# Patient Record
Sex: FEMALE | Race: WHITE | Employment: OTHER | URBAN - METROPOLITAN AREA
[De-identification: names, ages, dates, MRNs, and addresses within clinical notes are randomized per-mention and may not be internally consistent; named-entity substitution may affect disease eponyms.]

---

## 2017-02-09 ENCOUNTER — HISTORICAL (OUTPATIENT)
Dept: RADIOLOGY | Facility: HOSPITAL | Age: 67
End: 2017-02-09

## 2017-03-01 ENCOUNTER — HISTORICAL (OUTPATIENT)
Dept: PREADMISSION TESTING | Facility: HOSPITAL | Age: 67
End: 2017-03-01

## 2017-03-01 ENCOUNTER — HISTORICAL (OUTPATIENT)
Dept: LAB | Facility: HOSPITAL | Age: 67
End: 2017-03-01

## 2018-03-29 ENCOUNTER — HISTORICAL (OUTPATIENT)
Dept: LAB | Facility: HOSPITAL | Age: 68
End: 2018-03-29

## 2018-03-29 LAB — GRAM STN SPEC: NORMAL

## 2018-03-31 LAB — FINAL CULTURE: NORMAL

## 2018-05-07 LAB — FINAL CULTURE: NORMAL

## 2022-01-01 ENCOUNTER — HOSPITAL ENCOUNTER (INPATIENT)
Facility: HOSPITAL | Age: 72
LOS: 3 days | DRG: 283 | End: 2022-05-11
Attending: EMERGENCY MEDICINE | Admitting: INTERNAL MEDICINE
Payer: MEDICARE

## 2022-01-01 ENCOUNTER — LAB REQUISITION (OUTPATIENT)
Dept: LAB | Facility: HOSPITAL | Age: 72
End: 2022-01-01
Payer: MEDICARE

## 2022-01-01 VITALS
BODY MASS INDEX: 36.73 KG/M2 | HEIGHT: 65 IN | OXYGEN SATURATION: 97 % | SYSTOLIC BLOOD PRESSURE: 107 MMHG | RESPIRATION RATE: 32 BRPM | HEART RATE: 119 BPM | DIASTOLIC BLOOD PRESSURE: 60 MMHG | TEMPERATURE: 98 F | WEIGHT: 220.44 LBS

## 2022-01-01 DIAGNOSIS — I46.9 CARDIOPULMONARY ARREST: Primary | ICD-10-CM

## 2022-01-01 DIAGNOSIS — R06.02 SOB (SHORTNESS OF BREATH): ICD-10-CM

## 2022-01-01 DIAGNOSIS — J69.0 ASPIRATION PNEUMONITIS: ICD-10-CM

## 2022-01-01 DIAGNOSIS — R09.89 SUSPECTED CHF (CONGESTIVE HEART FAILURE): ICD-10-CM

## 2022-01-01 DIAGNOSIS — L73.8 OTHER SPECIFIED FOLLICULAR DISORDERS: ICD-10-CM

## 2022-01-01 DIAGNOSIS — R41.89 UNRESPONSIVENESS: ICD-10-CM

## 2022-01-01 DIAGNOSIS — I21.4 NSTEMI (NON-ST ELEVATED MYOCARDIAL INFARCTION): ICD-10-CM

## 2022-01-01 DIAGNOSIS — I21.4 NSTEMI (NON-ST ELEVATION MYOCARDIAL INFARCTION): ICD-10-CM

## 2022-01-01 DIAGNOSIS — Z78.9: ICD-10-CM

## 2022-01-01 DIAGNOSIS — M62.222 NONTRAUMATIC ISCHEMIC INFARCTION OF MUSCLE OF LEFT UPPER ARM: ICD-10-CM

## 2022-01-01 DIAGNOSIS — E87.20 ACIDOSIS: ICD-10-CM

## 2022-01-01 LAB
% SATURATION: 98
% SATURATION: 99
% SATURATION: 99
ABS NEUT (OLG): 6.7 X10(3)/MCL (ref 2.1–9.2)
ABS NEUT (OLG): 6461 X10(3)/MCL (ref 2.1–9.2)
ALBUMIN SERPL-MCNC: 1.9 GM/DL (ref 3.4–4.8)
ALBUMIN SERPL-MCNC: 2.1 GM/DL (ref 3.4–4.8)
ALBUMIN SERPL-MCNC: 2.5 GM/DL (ref 3.4–4.8)
ALBUMIN SERPL-MCNC: 2.9 GM/DL (ref 3.4–4.8)
ALBUMIN SERPL-MCNC: 3.5 GM/DL (ref 3.4–4.8)
ALBUMIN/GLOB SERPL: 0.5 RATIO (ref 1.1–2)
ALBUMIN/GLOB SERPL: 0.7 RATIO (ref 1.1–2)
ALBUMIN/GLOB SERPL: 0.8 RATIO (ref 1.1–2)
ALBUMIN/GLOB SERPL: 0.9 RATIO (ref 1.1–2)
ALBUMIN/GLOB SERPL: 1 RATIO (ref 1.1–2)
ALP SERPL-CCNC: 124 UNIT/L (ref 40–150)
ALP SERPL-CCNC: 157 UNIT/L (ref 40–150)
ALP SERPL-CCNC: 193 UNIT/L (ref 40–150)
ALP SERPL-CCNC: 62 UNIT/L (ref 40–150)
ALP SERPL-CCNC: 74 UNIT/L (ref 40–150)
ALT SERPL-CCNC: 16 UNIT/L (ref 0–55)
ALT SERPL-CCNC: 290 UNIT/L (ref 0–55)
ALT SERPL-CCNC: 322 UNIT/L (ref 0–55)
ALT SERPL-CCNC: 367 UNIT/L (ref 0–55)
ALT SERPL-CCNC: 487 UNIT/L (ref 0–55)
ANION GAP SERPL CALC-SCNC: 16 MMOL/L (ref 8–16)
ANION GAP SERPL CALC-SCNC: 22 MEQ/L
ANION GAP SERPL CALC-SCNC: 22 MEQ/L
ANION GAP SERPL CALC-SCNC: 24 MEQ/L
ANION GAP SERPL CALC-SCNC: 25 MEQ/L
APTT PPP: 114.5 SECONDS (ref 23.2–33.7)
APTT PPP: 149.7 SECONDS (ref 23.2–33.7)
APTT PPP: 27.2 SECONDS (ref 23.2–33.7)
APTT PPP: 38.7 SECONDS (ref 23.2–33.7)
APTT PPP: 65.8 SECONDS (ref 23.2–33.7)
APTT PPP: 84 SECONDS (ref 23.2–33.7)
APTT PPP: >200 SECONDS (ref 23.2–33.7)
AST SERPL-CCNC: 21 UNIT/L (ref 5–34)
AST SERPL-CCNC: 397 UNIT/L (ref 5–34)
AST SERPL-CCNC: 424 UNIT/L (ref 5–34)
AST SERPL-CCNC: 470 UNIT/L (ref 5–34)
AST SERPL-CCNC: 865 UNIT/L (ref 5–34)
AV INDEX (PROSTH): 0.76
AV MEAN GRADIENT: 5 MMHG
AV PEAK GRADIENT: 8 MMHG
AV VALVE AREA: 2.39 CM2
AV VELOCITY RATIO: 0.68
BACTERIA SPEC CULT: NORMAL
BACTERIA SPT CULT: ABNORMAL
BASE EXCESS ARTERIAL: -11.2 MMOL/L (ref -2–2)
BASE EXCESS ARTERIAL: -11.6 MMOL/L (ref -2–2)
BASE EXCESS ARTERIAL: -2.7 MMOL/L (ref -2–2)
BASE EXCESS ARTERIAL: -22 MMOL/L (ref -2–2)
BASE EXCESS ARTERIAL: -9.1 MMOL/L (ref -2–2)
BASE EXCESS ARTERIAL: 4.6 MMOL/L (ref -2–2)
BASE EXCESS ARTERIAL: ABNORMAL
BASE EXCESS ARTERIAL: NORMAL
BASOPHILS # BLD AUTO: 0.04 X10(3)/MCL (ref 0–0.2)
BASOPHILS # BLD AUTO: 0.06 X10(3)/MCL (ref 0–0.2)
BASOPHILS # BLD AUTO: 0.08 X10(3)/MCL (ref 0–0.2)
BASOPHILS # BLD AUTO: 0.09 X10(3)/MCL (ref 0–0.2)
BASOPHILS # BLD AUTO: 0.11 X10(3)/MCL (ref 0–0.2)
BASOPHILS # BLD AUTO: 0.18 X10(3)/MCL (ref 0–0.2)
BASOPHILS NFR BLD AUTO: 0.4 %
BASOPHILS NFR BLD AUTO: 0.4 %
BASOPHILS NFR BLD AUTO: 0.5 %
BASOPHILS NFR BLD AUTO: 0.7 %
BASOPHILS NFR BLD AUTO: 1 %
BASOPHILS NFR BLD AUTO: 1.2 %
BASOPHILS NFR BLD MANUAL: 1 %
BASOPHILS NFR BLD MANUAL: 1 %
BASOPHILS NFR BLD MANUAL: 91 %
BILIRUBIN DIRECT+TOT PNL SERPL-MCNC: 0.1 MG/DL (ref 0–0.5)
BILIRUBIN DIRECT+TOT PNL SERPL-MCNC: 0.5 MG/DL (ref 0–0.5)
BILIRUBIN DIRECT+TOT PNL SERPL-MCNC: 0.5 MG/DL (ref 0–0.5)
BILIRUBIN DIRECT+TOT PNL SERPL-MCNC: 0.5 MG/DL (ref 0–0.8)
BILIRUBIN DIRECT+TOT PNL SERPL-MCNC: 0.5 MG/DL (ref 0–0.8)
BILIRUBIN DIRECT+TOT PNL SERPL-MCNC: 0.6 MG/DL (ref 0–0.8)
BILIRUBIN DIRECT+TOT PNL SERPL-MCNC: 0.6 MG/DL (ref 0–0.8)
BILIRUBIN DIRECT+TOT PNL SERPL-MCNC: 0.7 MG/DL
BILIRUBIN DIRECT+TOT PNL SERPL-MCNC: 0.9 MG/DL (ref 0–0.5)
BILIRUBIN DIRECT+TOT PNL SERPL-MCNC: 0.9 MG/DL (ref 0–0.8)
BILIRUBIN DIRECT+TOT PNL SERPL-MCNC: 1 MG/DL
BILIRUBIN DIRECT+TOT PNL SERPL-MCNC: 1 MG/DL
BILIRUBIN DIRECT+TOT PNL SERPL-MCNC: 1.5 MG/DL
BILIRUBIN DIRECT+TOT PNL SERPL-MCNC: 1.6 MG/DL (ref 0–0.5)
BILIRUBIN DIRECT+TOT PNL SERPL-MCNC: 2.5 MG/DL
BNP BLD-MCNC: 333.5 PG/ML
BSA FOR ECHO PROCEDURE: 2.14 M2
BUN SERPL-MCNC: 17 MG/DL (ref 9.8–20.1)
BUN SERPL-MCNC: 22.9 MG/DL (ref 9.8–20.1)
BUN SERPL-MCNC: 23 MG/DL (ref 6–30)
BUN SERPL-MCNC: 23 MG/DL (ref 9.8–20.1)
BUN SERPL-MCNC: 24.7 MG/DL (ref 9.8–20.1)
BUN SERPL-MCNC: 25 MG/DL (ref 9.8–20.1)
BUN SERPL-MCNC: 25.7 MG/DL (ref 9.8–20.1)
BUN SERPL-MCNC: 26 MG/DL (ref 9.8–20.1)
BUN SERPL-MCNC: 26.2 MG/DL (ref 9.8–20.1)
BUN SERPL-MCNC: 26.9 MG/DL (ref 9.8–20.1)
BUN SERPL-MCNC: 29.4 MG/DL (ref 9.8–20.1)
BUN SERPL-MCNC: 49.5 MG/DL (ref 9.8–20.1)
BURR CELLS (OLG): ABNORMAL
CA-I SERPL-MCNC: 0.97 MMOL/L
CA-I SERPL-MCNC: 1.05 MMOL/L
CA-I SERPL-MCNC: 1.23 MMOL/L
CALCIUM BLD-MCNC: 0.94 MMOL/L
CALCIUM BLD-MCNC: 1.04 MMOL/L
CALCIUM BLD-MCNC: 1.05 MMOL/L
CALCIUM SERPL-MCNC: 10 MG/DL (ref 8.4–10.2)
CALCIUM SERPL-MCNC: 7 MG/DL (ref 8.4–10.2)
CALCIUM SERPL-MCNC: 7.4 MG/DL (ref 8.4–10.2)
CALCIUM SERPL-MCNC: 7.6 MG/DL (ref 8.4–10.2)
CALCIUM SERPL-MCNC: 7.7 MG/DL (ref 8.4–10.2)
CALCIUM SERPL-MCNC: 7.8 MG/DL (ref 8.4–10.2)
CALCIUM SERPL-MCNC: 7.8 MG/DL (ref 8.4–10.2)
CALCIUM SERPL-MCNC: 7.9 MG/DL (ref 8.4–10.2)
CALCIUM SERPL-MCNC: 8 MG/DL (ref 8.4–10.2)
CALCIUM SERPL-MCNC: 8.4 MG/DL (ref 8.4–10.2)
CALCIUM SERPL-MCNC: 8.5 MG/DL (ref 8.4–10.2)
CHLORIDE SERPL-SCNC: 100 MMOL/L (ref 98–107)
CHLORIDE SERPL-SCNC: 101 MMOL/L (ref 98–107)
CHLORIDE SERPL-SCNC: 102 MMOL/L (ref 98–107)
CHLORIDE SERPL-SCNC: 102 MMOL/L (ref 98–107)
CHLORIDE SERPL-SCNC: 103 MMOL/L (ref 95–110)
CHLORIDE SERPL-SCNC: 103 MMOL/L (ref 98–107)
CHLORIDE SERPL-SCNC: 105 MMOL/L (ref 98–107)
CHLORIDE SERPL-SCNC: 106 MMOL/L (ref 98–107)
CHLORIDE SERPL-SCNC: 107 MMOL/L (ref 98–107)
CHLORIDE SERPL-SCNC: 107 MMOL/L (ref 98–107)
CHLORIDE SERPL-SCNC: 99 MMOL/L (ref 98–107)
CHLORIDE SERPL-SCNC: 99 MMOL/L (ref 98–107)
CHOLEST SERPL-MCNC: 263 MG/DL
CHOLEST/HDLC SERPL: 9 {RATIO} (ref 0–5)
CO2 SERPL-SCNC: 14 MMOL/L (ref 23–31)
CO2 SERPL-SCNC: 14 MMOL/L (ref 23–31)
CO2 SERPL-SCNC: 15 MMOL/L (ref 23–31)
CO2 SERPL-SCNC: 16 MMOL/L (ref 23–31)
CO2 SERPL-SCNC: 16 MMOL/L (ref 23–31)
CO2 SERPL-SCNC: 18 MMOL/L (ref 23–31)
CO2 SERPL-SCNC: 18 MMOL/L (ref 23–31)
CO2 SERPL-SCNC: 19 MMOL/L (ref 23–31)
CO2 SERPL-SCNC: 21 MMOL/L (ref 23–31)
CO2 SERPL-SCNC: 23 MMOL/L (ref 23–31)
CO2 SERPL-SCNC: 24 MMOL/L (ref 23–31)
CO2 TOTAL: 15.8
CO2 TOTAL: 17.8
CO2 TOTAL: 20.6
CO2 TOTAL: 26.5
CO2 TOTAL: 31.2
CREAT SERPL-MCNC: 1.2 MG/DL (ref 0.5–1.4)
CREAT SERPL-MCNC: 1.52 MG/DL (ref 0.55–1.02)
CREAT SERPL-MCNC: 1.56 MG/DL (ref 0.55–1.02)
CREAT SERPL-MCNC: 1.75 MG/DL (ref 0.55–1.02)
CREAT SERPL-MCNC: 1.78 MG/DL (ref 0.55–1.02)
CREAT SERPL-MCNC: 1.8 MG/DL (ref 0.55–1.02)
CREAT SERPL-MCNC: 1.81 MG/DL (ref 0.55–1.02)
CREAT SERPL-MCNC: 1.92 MG/DL (ref 0.55–1.02)
CREAT SERPL-MCNC: 1.93 MG/DL (ref 0.55–1.02)
CREAT SERPL-MCNC: 1.98 MG/DL (ref 0.55–1.02)
CREAT SERPL-MCNC: 2 MG/DL (ref 0.55–1.02)
CREAT SERPL-MCNC: 2.01 MG/DL (ref 0.55–1.02)
CREAT/UREA NIT SERPL: 13
CREAT/UREA NIT SERPL: 14
CREAT/UREA NIT SERPL: 14
DOP CALC AO PEAK VEL: 1.37 M/S
DOP CALC AO VTI: 22.7 CM
DOP CALC LVOT AREA: 3.1 CM2
DOP CALC LVOT DIAMETER: 2 CM
DOP CALC LVOT PEAK VEL: 0.93 M/S
DOP CALC LVOT STROKE VOLUME: 54.32 CM3
DOP CALC MV VTI: 37.2 CM
DOP CALCLVOT PEAK VEL VTI: 17.3 CM
E WAVE DECELERATION TIME: 277 MSEC
E/A RATIO: 1
E/E' RATIO: 9.25 M/S
EJECTION FRACTION: 45 %
EOSINOPHIL # BLD AUTO: 0 X10(3)/MCL (ref 0–0.9)
EOSINOPHIL # BLD AUTO: 0 X10(3)/MCL (ref 0–0.9)
EOSINOPHIL # BLD AUTO: 0.05 X10(3)/MCL (ref 0–0.9)
EOSINOPHIL # BLD AUTO: 0.05 X10(3)/MCL (ref 0–0.9)
EOSINOPHIL # BLD AUTO: 0.12 X10(3)/MCL (ref 0–0.9)
EOSINOPHIL # BLD AUTO: 0.8 X10(3)/MCL (ref 0–0.9)
EOSINOPHIL NFR BLD AUTO: 0 %
EOSINOPHIL NFR BLD AUTO: 0 %
EOSINOPHIL NFR BLD AUTO: 0.3 %
EOSINOPHIL NFR BLD AUTO: 0.6 %
EOSINOPHIL NFR BLD AUTO: 1.3 %
EOSINOPHIL NFR BLD AUTO: 7 %
EOSINOPHIL NFR BLD MANUAL: 1 %
EOSINOPHIL NFR BLD MANUAL: 182 /MCL (ref 0–900)
EOSINOPHIL NFR BLD MANUAL: 2 %
EOSINOPHIL NFR BLD MANUAL: 6 %
EOSINOPHIL NFR BLD MANUAL: 78 /MCL (ref 0–900)
ERYTHROCYTE [DISTWIDTH] IN BLOOD BY AUTOMATED COUNT: 12.8 % (ref 11.5–17)
ERYTHROCYTE [DISTWIDTH] IN BLOOD BY AUTOMATED COUNT: 12.9 % (ref 11.5–17)
ERYTHROCYTE [DISTWIDTH] IN BLOOD BY AUTOMATED COUNT: 13 % (ref 11.5–17)
ERYTHROCYTE [DISTWIDTH] IN BLOOD BY AUTOMATED COUNT: 13.2 % (ref 11.5–17)
ERYTHROCYTE [DISTWIDTH] IN BLOOD BY AUTOMATED COUNT: 13.3 % (ref 11.5–17)
ERYTHROCYTE [DISTWIDTH] IN BLOOD BY AUTOMATED COUNT: 13.8 % (ref 11.5–17)
ERYTHROCYTE [DISTWIDTH] IN BLOOD BY AUTOMATED COUNT: 14 % (ref 11.5–17)
EST. AVERAGE GLUCOSE BLD GHB EST-MCNC: 335 MG/DL
GLOBULIN SER-MCNC: 2.6 GM/DL (ref 2.4–3.5)
GLOBULIN SER-MCNC: 2.6 GM/DL (ref 2.4–3.5)
GLOBULIN SER-MCNC: 3.1 GM/DL (ref 2.4–3.5)
GLOBULIN SER-MCNC: 3.5 GM/DL (ref 2.4–3.5)
GLOBULIN SER-MCNC: 4.7 GM/DL (ref 2.4–3.5)
GLUCOSE SERPL-MCNC: 119 MG/DL (ref 82–115)
GLUCOSE SERPL-MCNC: 176 MG/DL (ref 82–115)
GLUCOSE SERPL-MCNC: 194 MG/DL (ref 82–115)
GLUCOSE SERPL-MCNC: 296 MG/DL (ref 82–115)
GLUCOSE SERPL-MCNC: 486 MG/DL (ref 82–115)
GLUCOSE SERPL-MCNC: 512 MG/DL (ref 70–110)
GLUCOSE SERPL-MCNC: 554 MG/DL (ref 82–115)
GLUCOSE SERPL-MCNC: 685 MG/DL (ref 82–115)
GLUCOSE SERPL-MCNC: 722 MG/DL (ref 82–115)
GLUCOSE SERPL-MCNC: 780 MG/DL (ref 82–115)
GLUCOSE SERPL-MCNC: 781 MG/DL (ref 82–115)
GLUCOSE SERPL-MCNC: 800 MG/DL (ref 82–115)
GLUCOSE SERPL-MCNC: >500 MG/DL (ref 70–110)
GRAM STN SPEC: ABNORMAL
GRAM STN SPEC: ABNORMAL
HAV IGM SERPL QL IA: NONREACTIVE
HBA1C MFR BLD: 13.3 %
HBV CORE IGM SERPL QL IA: NONREACTIVE
HBV SURFACE AG SERPL QL IA: NONREACTIVE
HCO3 ARTERIAL: 11.7 MMOL/L (ref 18–23)
HCO3 ARTERIAL: 14.8 MMOL/L (ref 18–23)
HCO3 ARTERIAL: 16.4 MMOL/L (ref 18–23)
HCO3 ARTERIAL: 19.1 MMOL/L (ref 18–23)
HCO3 ARTERIAL: 22.1 MMOL/L (ref 18–23)
HCO3 ARTERIAL: 22.5 MMOL/L (ref 18–23)
HCO3 ARTERIAL: 22.9 MMOL/L (ref 18–23)
HCO3 ARTERIAL: 24.2 MMOL/L (ref 18–23)
HCO3 ARTERIAL: 24.8 MMOL/L (ref 18–23)
HCO3 ARTERIAL: 29.8 MMOL/L (ref 18–23)
HCT VFR BLD AUTO: 37.2 % (ref 37–47)
HCT VFR BLD AUTO: 37.6 % (ref 37–47)
HCT VFR BLD AUTO: 41.7 % (ref 37–47)
HCT VFR BLD AUTO: 42.5 % (ref 37–47)
HCT VFR BLD AUTO: 44.7 % (ref 37–47)
HCT VFR BLD AUTO: 46.9 % (ref 37–47)
HCT VFR BLD AUTO: 50.1 % (ref 37–47)
HCT VFR BLD CALC: 50 %PCV (ref 36–54)
HCV AB SERPL QL IA: NONREACTIVE
HDLC SERPL-MCNC: 30 MG/DL (ref 35–60)
HGB BLD-MCNC: 11.4 GM/DL (ref 12–16)
HGB BLD-MCNC: 12.2 GM/DL (ref 12–16)
HGB BLD-MCNC: 13.4 GM/DL (ref 12–16)
HGB BLD-MCNC: 13.9 GM/DL (ref 12–16)
HGB BLD-MCNC: 14.2 GM/DL (ref 12–16)
HGB BLD-MCNC: 14.8 GM/DL (ref 12–16)
HGB BLD-MCNC: 15.2 GM/DL (ref 12–16)
HGB BLD-MCNC: 17 G/DL
HGB BLD-MCNC: ABNORMAL G/DL
HGB BLD-MCNC: NORMAL G/DL
HIV 1+2 AB+HIV1 P24 AG SERPL QL IA: NONREACTIVE
IMM GRANULOCYTES # BLD AUTO: 0.04 X10(3)/MCL (ref 0–0.02)
IMM GRANULOCYTES # BLD AUTO: 0.04 X10(3)/MCL (ref 0–0.02)
IMM GRANULOCYTES # BLD AUTO: 0.07 X10(3)/MCL (ref 0–0.02)
IMM GRANULOCYTES # BLD AUTO: 0.08 X10(3)/MCL (ref 0–0.02)
IMM GRANULOCYTES # BLD AUTO: 0.34 X10(3)/MCL (ref 0–0.02)
IMM GRANULOCYTES # BLD AUTO: 0.5 X10(3)/MCL (ref 0–0.02)
IMM GRANULOCYTES # BLD AUTO: 3.65 X10(3)/MCL (ref 0–0.02)
IMM GRANULOCYTES NFR BLD AUTO: 0.4 % (ref 0–0.43)
IMM GRANULOCYTES NFR BLD AUTO: 0.5 % (ref 0–0.43)
IMM GRANULOCYTES NFR BLD AUTO: 0.5 % (ref 0–0.43)
IMM GRANULOCYTES NFR BLD AUTO: 0.8 % (ref 0–0.43)
IMM GRANULOCYTES NFR BLD AUTO: 1.7 % (ref 0–0.43)
IMM GRANULOCYTES NFR BLD AUTO: 18.8 % (ref 0–0.43)
IMM GRANULOCYTES NFR BLD AUTO: 2.5 % (ref 0–0.43)
INR BLD: 1.06 (ref 0–1.3)
INR BLD: 1.7 (ref 0–1.3)
INSTRUMENT WBC (OLG): 19.4 X10(3)/MCL
INSTRUMENT WBC (OLG): 7.8 X10(3)/MCL
INSTRUMENT WBC (OLG): 9.1 X10(3)/MCL
LACTATE SERPL-SCNC: 10.8 MMOL/L (ref 0.5–2.2)
LACTATE SERPL-SCNC: 11.9 MMOL/L (ref 0.5–2.2)
LACTATE SERPL-SCNC: 12.1 MMOL/L (ref 0.5–2.2)
LACTATE SERPL-SCNC: 12.4 MMOL/L (ref 0.5–2.2)
LACTATE SERPL-SCNC: 12.5 MMOL/L (ref 0.5–2.2)
LACTATE SERPL-SCNC: 16.9 MMOL/L (ref 0.5–2.2)
LACTATE SERPL-SCNC: 3.6 MMOL/L (ref 0.5–2.2)
LACTATE SERPL-SCNC: 3.6 MMOL/L (ref 0.5–2.2)
LACTATE SERPL-SCNC: 3.7 MMOL/L (ref 0.5–2.2)
LACTATE SERPL-SCNC: 3.9 MMOL/L (ref 0.5–2.2)
LACTATE SERPL-SCNC: 4.1 MMOL/L (ref 0.5–2.2)
LACTATE SERPL-SCNC: 4.8 MMOL/L (ref 0.5–2.2)
LACTATE SERPL-SCNC: 5.2 MMOL/L (ref 0.5–2.2)
LACTATE SERPL-SCNC: 5.4 MMOL/L (ref 0.5–2.2)
LACTATE SERPL-SCNC: 6.5 MMOL/L (ref 0.5–2.2)
LACTATE SERPL-SCNC: 6.8 MMOL/L (ref 0.5–2.2)
LACTATE SERPL-SCNC: 7 MMOL/L (ref 0.5–2.2)
LACTATE SERPL-SCNC: 8 MMOL/L (ref 0.5–2.2)
LACTATE SERPL-SCNC: 8.5 MMOL/L (ref 0.5–2.2)
LACTATE SERPL-SCNC: 9.1 MMOL/L (ref 0.5–2.2)
LDLC SERPL CALC-MCNC: 142 MG/DL (ref 50–140)
LEFT ATRIUM SIZE: 3.1 CM
LEFT ATRIUM VOLUME INDEX MOD: 5.3 ML/M2
LEFT ATRIUM VOLUME MOD: 10.9 CM3
LEFT VENTRICLE DIASTOLIC VOLUME INDEX: 28.83 ML/M2
LEFT VENTRICLE DIASTOLIC VOLUME: 59.4 ML
LEFT VENTRICLE SYSTOLIC VOLUME INDEX: 16.5 ML/M2
LEFT VENTRICLE SYSTOLIC VOLUME: 33.9 ML
LV LATERAL E/E' RATIO: 9.25 M/S
LV SEPTAL E/E' RATIO: 9.25 M/S
LVOT MG: 2 MMHG
LVOT MV: 0.74 CM/S
LYMPHOCYTES # BLD AUTO: 1.15 X10(3)/MCL (ref 0.6–4.6)
LYMPHOCYTES # BLD AUTO: 1.17 X10(3)/MCL (ref 0.6–4.6)
LYMPHOCYTES # BLD AUTO: 1.28 X10(3)/MCL (ref 0.6–4.6)
LYMPHOCYTES # BLD AUTO: 1.57 X10(3)/MCL (ref 0.6–4.6)
LYMPHOCYTES # BLD AUTO: 2.14 X10(3)/MCL (ref 0.6–4.6)
LYMPHOCYTES # BLD AUTO: 4.99 X10(3)/MCL (ref 0.6–4.6)
LYMPHOCYTES NFR BLD AUTO: 10 %
LYMPHOCYTES NFR BLD AUTO: 14.7 %
LYMPHOCYTES NFR BLD AUTO: 23.6 %
LYMPHOCYTES NFR BLD AUTO: 43.8 %
LYMPHOCYTES NFR BLD AUTO: 5.8 %
LYMPHOCYTES NFR BLD AUTO: 6.5 %
LYMPHOCYTES NFR BLD MANUAL: 0.97 X10(3)/MCL (ref 3.4–13.7)
LYMPHOCYTES NFR BLD MANUAL: 1638 /MCL (ref 3400–13700)
LYMPHOCYTES NFR BLD MANUAL: 18 %
LYMPHOCYTES NFR BLD MANUAL: 2 %
LYMPHOCYTES NFR BLD MANUAL: 3 %
LYMPHOCYTES NFR BLD MANUAL: 42 %
LYMPHOCYTES NFR BLD MANUAL: 5 %
LYMPHOCYTES NFR BLD MANUAL: 702 /MCL (ref 3400–13700)
LYMPHOCYTES NFR BLD MANUAL: 9 %
Lab: >700
MACROCYTES BLD QL SMEAR: ABNORMAL
MAGNESIUM SERPL-MCNC: 1.5 MG/DL (ref 1.6–2.6)
MAGNESIUM SERPL-MCNC: 1.6 MG/DL (ref 1.6–2.6)
MAGNESIUM SERPL-MCNC: 1.7 MG/DL (ref 1.6–2.6)
MAGNESIUM SERPL-MCNC: 1.7 MG/DL (ref 1.6–2.6)
MAGNESIUM SERPL-MCNC: 1.9 MG/DL (ref 1.6–2.6)
MAGNESIUM SERPL-MCNC: 2 MG/DL (ref 1.6–2.6)
MAGNESIUM SERPL-MCNC: 2 MG/DL (ref 1.6–2.6)
MAGNESIUM SERPL-MCNC: 2.1 MG/DL (ref 1.6–2.6)
MAGNESIUM SERPL-MCNC: 2.3 MG/DL (ref 1.6–2.6)
MCH RBC QN AUTO: 27.7 PG (ref 27–31)
MCH RBC QN AUTO: 28 PG (ref 27–31)
MCH RBC QN AUTO: 28.2 PG (ref 27–31)
MCH RBC QN AUTO: 28.3 PG (ref 27–31)
MCH RBC QN AUTO: 28.7 PG (ref 27–31)
MCHC RBC AUTO-ENTMCNC: 30.3 MG/DL (ref 33–36)
MCHC RBC AUTO-ENTMCNC: 30.6 MG/DL (ref 33–36)
MCHC RBC AUTO-ENTMCNC: 31.6 MG/DL (ref 33–36)
MCHC RBC AUTO-ENTMCNC: 31.8 MG/DL (ref 33–36)
MCHC RBC AUTO-ENTMCNC: 32.1 MG/DL (ref 33–36)
MCHC RBC AUTO-ENTMCNC: 32.4 MG/DL (ref 33–36)
MCHC RBC AUTO-ENTMCNC: 32.7 MG/DL (ref 33–36)
MCV RBC AUTO: 86.2 FL (ref 80–94)
MCV RBC AUTO: 86.6 FL (ref 80–94)
MCV RBC AUTO: 87.2 FL (ref 80–94)
MCV RBC AUTO: 88.7 FL (ref 80–94)
MCV RBC AUTO: 88.7 FL (ref 80–94)
MCV RBC AUTO: 90.5 FL (ref 80–94)
MCV RBC AUTO: 94.7 FL (ref 80–94)
METAMYELOCYTES NFR BLD MANUAL: 1 %
METAMYELOCYTES NFR BLD MANUAL: 12 %
METAMYELOCYTES NFR BLD MANUAL: 17 %
METAMYELOCYTES NFR BLD MANUAL: 8 %
METAMYELOCYTES NFR BLD MANUAL: 8 %
MONOCYTES # BLD AUTO: 0.17 X10(3)/MCL (ref 0.1–1.3)
MONOCYTES # BLD AUTO: 0.27 X10(3)/MCL (ref 0.1–1.3)
MONOCYTES # BLD AUTO: 0.29 X10(3)/MCL (ref 0.1–1.3)
MONOCYTES # BLD AUTO: 0.35 X10(3)/MCL (ref 0.1–1.3)
MONOCYTES # BLD AUTO: 0.46 X10(3)/MCL (ref 0.1–1.3)
MONOCYTES # BLD AUTO: 0.77 X10(3)/MCL (ref 0.1–1.3)
MONOCYTES NFR BLD AUTO: 1.4 %
MONOCYTES NFR BLD AUTO: 1.8 %
MONOCYTES NFR BLD AUTO: 1.9 %
MONOCYTES NFR BLD AUTO: 2.9 %
MONOCYTES NFR BLD AUTO: 3.4 %
MONOCYTES NFR BLD AUTO: 6.8 %
MONOCYTES NFR BLD MANUAL: 1 %
MONOCYTES NFR BLD MANUAL: 1.94 X10(3)/MCL (ref 0.1–1.3)
MONOCYTES NFR BLD MANUAL: 10 %
MONOCYTES NFR BLD MANUAL: 2 %
MONOCYTES NFR BLD MANUAL: 234 /MCL (ref 100–1300)
MONOCYTES NFR BLD MANUAL: 3 %
MONOCYTES NFR BLD MANUAL: 637 /MCL (ref 100–1300)
MONOCYTES NFR BLD MANUAL: 7 %
MONOCYTES NFR BLD MANUAL: 7 %
MRSA PCR SCRN (OHS): NOT DETECTED
MV MEAN GRADIENT: 1 MMHG
MV PEAK A VEL: 0.74 M/S
MV PEAK E VEL: 0.74 M/S
MV PEAK GRADIENT: 5 MMHG
MV STENOSIS PRESSURE HALF TIME: 91 MS
MV VALVE AREA BY CONTINUITY EQUATION: 1.46 CM2
MV VALVE AREA P 1/2 METHOD: 2.42 CM2
MYELOCYTES NFR BLD MANUAL: 1 %
MYELOCYTES NFR BLD MANUAL: 2 %
NEUTROPHILS # BLD AUTO: 13.3 X10(3)/MCL (ref 2.1–9.2)
NEUTROPHILS # BLD AUTO: 17.5 X10(3)/MCL (ref 2.1–9.2)
NEUTROPHILS # BLD AUTO: 18.3 X10(3)/MCL (ref 2.1–9.2)
NEUTROPHILS # BLD AUTO: 4.7 X10(3)/MCL (ref 2.1–9.2)
NEUTROPHILS # BLD AUTO: 6.3 X10(3)/MCL (ref 2.1–9.2)
NEUTROPHILS # BLD AUTO: 6.5 X10(3)/MCL (ref 2.1–9.2)
NEUTROPHILS NFR BLD AUTO: 41 %
NEUTROPHILS NFR BLD AUTO: 71.7 %
NEUTROPHILS NFR BLD AUTO: 80.3 %
NEUTROPHILS NFR BLD AUTO: 85.1 %
NEUTROPHILS NFR BLD AUTO: 88.8 %
NEUTROPHILS NFR BLD AUTO: 90.7 %
NEUTROPHILS NFR BLD MANUAL: 45 %
NEUTROPHILS NFR BLD MANUAL: 71 %
NEUTROPHILS NFR BLD MANUAL: 77 %
NEUTROPHILS NFR BLD MANUAL: 78 %
NEUTROPHILS NFR BLD MANUAL: 79 %
NEUTROPHILS NFR BLD MANUAL: 82 %
NRBC BLD AUTO-RTO: 0 %
NRBC BLD AUTO-RTO: 0.3 %
O2 SATURATION ARTERIAL: 80 % (ref 95–98)
O2 SATURATION ARTERIAL: 96 % (ref 95–98)
O2 SATURATION ARTERIAL: 99 % (ref 95–98)
PATH REV: NORMAL
PCO2 ARTERIAL: 33 MM HG (ref 35–45)
PCO2 ARTERIAL: 44
PCO2 ARTERIAL: 46
PCO2 ARTERIAL: 50
PCO2 ARTERIAL: 54 MM HG (ref 35–45)
PCO2 ARTERIAL: 65 MM HG (ref 35–45)
PCO2 BLDA: ABNORMAL MM[HG]
PCO2 BLDA: ABNORMAL MM[HG]
PCO2 BLDA: NORMAL MM[HG]
PCO2 BLDA: NORMAL MM[HG]
PH ARTERIAL: 6.86 (ref 7.35–7.45)
PH ARTERIAL: 7.18
PH ARTERIAL: 7.19
PH ARTERIAL: 7.26 (ref 7.35–7.45)
PH ARTERIAL: 7.27 (ref 7.35–7.45)
PH ARTERIAL: 7.42
PH SMN: ABNORMAL [PH]
PH SMN: NORMAL [PH]
PHOSPHATE SERPL-MCNC: 2 MG/DL (ref 2.3–4.7)
PHOSPHATE SERPL-MCNC: 2.3 MG/DL (ref 2.3–4.7)
PHOSPHATE SERPL-MCNC: 2.4 MG/DL (ref 2.3–4.7)
PHOSPHATE SERPL-MCNC: 2.5 MG/DL (ref 2.3–4.7)
PHOSPHATE SERPL-MCNC: 2.6 MG/DL (ref 2.3–4.7)
PHOSPHATE SERPL-MCNC: 3.4 MG/DL (ref 2.3–4.7)
PHOSPHATE SERPL-MCNC: 3.5 MG/DL (ref 2.3–4.7)
PHOSPHATE SERPL-MCNC: 3.7 MG/DL (ref 2.3–4.7)
PHOSPHATE SERPL-MCNC: 3.7 MG/DL (ref 2.3–4.7)
PHOSPHATE SERPL-MCNC: 3.8 MG/DL (ref 2.3–4.7)
PHOSPHATE SERPL-MCNC: 3.9 MG/DL (ref 2.3–4.7)
PHOSPHATE SERPL-MCNC: 4.1 MG/DL (ref 2.3–4.7)
PHOSPHATE SERPL-MCNC: 4.1 MG/DL (ref 2.3–4.7)
PHOSPHATE SERPL-MCNC: 4.2 MG/DL (ref 2.3–4.7)
PHOSPHATE SERPL-MCNC: 4.5 MG/DL (ref 2.3–4.7)
PHOSPHATE SERPL-MCNC: 5 MG/DL (ref 2.3–4.7)
PHOSPHATE SERPL-MCNC: 5.2 MG/DL (ref 2.3–4.7)
PHOSPHATE SERPL-MCNC: 5.3 MG/DL (ref 2.3–4.7)
PHOSPHATE SERPL-MCNC: 5.7 MG/DL (ref 2.3–4.7)
PHOSPHATE SERPL-MCNC: 5.8 MG/DL (ref 2.3–4.7)
PLATELET # BLD AUTO: 106 X10(3)/MCL (ref 130–400)
PLATELET # BLD AUTO: 119 X10(3)/MCL (ref 130–400)
PLATELET # BLD AUTO: 142 X10(3)/MCL (ref 130–400)
PLATELET # BLD AUTO: 161 X10(3)/MCL (ref 130–400)
PLATELET # BLD AUTO: 168 X10(3)/MCL (ref 130–400)
PLATELET # BLD AUTO: 172 X10(3)/MCL (ref 130–400)
PLATELET # BLD AUTO: 92 X10(3)/MCL (ref 130–400)
PLATELET # BLD EST: ABNORMAL 10*3/UL
PLATELET # BLD EST: ABNORMAL 10*3/UL
PLATELET # BLD EST: ADEQUATE 10*3/UL
PLATELET # BLD EST: NORMAL 10*3/UL
PMV BLD AUTO: 13.2 FL (ref 9.4–12.4)
PMV BLD AUTO: 13.3 FL (ref 9.4–12.4)
PMV BLD AUTO: 13.8 FL (ref 9.4–12.4)
PMV BLD AUTO: 13.8 FL (ref 9.4–12.4)
PMV BLD AUTO: 14.1 FL (ref 9.4–12.4)
PO2 ARTERIAL: 106
PO2 ARTERIAL: 139 MM HG (ref 83–108)
PO2 ARTERIAL: 148
PO2 ARTERIAL: 154
PO2 ARTERIAL: 77 MM HG (ref 83–108)
PO2 ARTERIAL: 92 MM HG (ref 83–108)
PO2 BLDA: ABNORMAL MM[HG]
PO2 BLDA: NORMAL MM[HG]
POC CARDIAC TROPONIN I: 0.87 NG/ML
POC COHB: ABNORMAL
POC COHB: NORMAL
POC IONIZED CALCIUM: 1.14 MMOL/L (ref 1.06–1.42)
POC IONIZED CALCIUM: ABNORMAL
POC IONIZED CALCIUM: NORMAL
POC METHB: ABNORMAL
POC METHB: NORMAL
POC O2HB: ABNORMAL
POC O2HB: NORMAL
POC TCO2 (MEASURED): 25 MMOL/L (ref 23–29)
POCT GLUCOSE: 102 MG/DL (ref 70–110)
POCT GLUCOSE: 108 MG/DL (ref 70–110)
POCT GLUCOSE: 113 MG/DL (ref 70–110)
POCT GLUCOSE: 113 MG/DL (ref 70–110)
POCT GLUCOSE: 122 MG/DL (ref 70–110)
POCT GLUCOSE: 124 MG/DL (ref 70–110)
POCT GLUCOSE: 125 MG/DL (ref 70–110)
POCT GLUCOSE: 125 MG/DL (ref 70–110)
POCT GLUCOSE: 126 MG/DL (ref 70–110)
POCT GLUCOSE: 127 MG/DL (ref 70–110)
POCT GLUCOSE: 132 MG/DL (ref 70–110)
POCT GLUCOSE: 133 MG/DL (ref 70–110)
POCT GLUCOSE: 134 MG/DL (ref 70–110)
POCT GLUCOSE: 137 MG/DL (ref 70–110)
POCT GLUCOSE: 139 MG/DL (ref 70–110)
POCT GLUCOSE: 143 MG/DL (ref 70–110)
POCT GLUCOSE: 147 MG/DL (ref 70–110)
POCT GLUCOSE: 147 MG/DL (ref 70–110)
POCT GLUCOSE: 149 MG/DL (ref 70–110)
POCT GLUCOSE: 150 MG/DL (ref 70–110)
POCT GLUCOSE: 151 MG/DL (ref 70–110)
POCT GLUCOSE: 155 MG/DL (ref 70–110)
POCT GLUCOSE: 157 MG/DL (ref 70–110)
POCT GLUCOSE: 159 MG/DL (ref 70–110)
POCT GLUCOSE: 161 MG/DL (ref 70–110)
POCT GLUCOSE: 164 MG/DL (ref 70–110)
POCT GLUCOSE: 164 MG/DL (ref 70–110)
POCT GLUCOSE: 167 MG/DL (ref 70–110)
POCT GLUCOSE: 168 MG/DL (ref 70–110)
POCT GLUCOSE: 168 MG/DL (ref 70–110)
POCT GLUCOSE: 170 MG/DL (ref 70–110)
POCT GLUCOSE: 177 MG/DL (ref 70–110)
POCT GLUCOSE: 179 MG/DL (ref 70–110)
POCT GLUCOSE: 180 MG/DL (ref 70–110)
POCT GLUCOSE: 189 MG/DL (ref 70–110)
POCT GLUCOSE: 191 MG/DL (ref 70–110)
POCT GLUCOSE: 192 MG/DL (ref 70–110)
POCT GLUCOSE: 194 MG/DL (ref 70–110)
POCT GLUCOSE: 195 MG/DL (ref 70–110)
POCT GLUCOSE: 196 MG/DL (ref 70–110)
POCT GLUCOSE: 200 MG/DL (ref 70–110)
POCT GLUCOSE: 203 MG/DL (ref 70–110)
POCT GLUCOSE: 206 MG/DL (ref 70–110)
POCT GLUCOSE: 208 MG/DL (ref 70–110)
POCT GLUCOSE: 209 MG/DL (ref 70–110)
POCT GLUCOSE: 210 MG/DL (ref 70–110)
POCT GLUCOSE: 211 MG/DL (ref 70–110)
POCT GLUCOSE: 216 MG/DL (ref 70–110)
POCT GLUCOSE: 226 MG/DL (ref 70–110)
POCT GLUCOSE: 230 MG/DL (ref 70–110)
POCT GLUCOSE: 250 MG/DL (ref 70–110)
POCT GLUCOSE: 308 MG/DL (ref 70–110)
POCT GLUCOSE: 357 MG/DL (ref 70–110)
POCT GLUCOSE: 476 MG/DL (ref 70–110)
POCT GLUCOSE: 96 MG/DL (ref 70–110)
POCT GLUCOSE: 98 MG/DL (ref 70–110)
POCT GLUCOSE: 99 MG/DL (ref 70–110)
POCT GLUCOSE: >500 MG/DL (ref 70–110)
POIKILOCYTOSIS BLD QL SMEAR: ABNORMAL
POTASSIUM BLD-SCNC: 4.1 MMOL/L (ref 3.5–5.1)
POTASSIUM BLD-SCNC: ABNORMAL MMOL/L
POTASSIUM BLD-SCNC: NORMAL MMOL/L
POTASSIUM BLOOD ARTERIAL: 2.8
POTASSIUM BLOOD ARTERIAL: 3
POTASSIUM BLOOD ARTERIAL: 3.3
POTASSIUM SERPL-SCNC: 2.7 MMOL/L (ref 3.5–5.1)
POTASSIUM SERPL-SCNC: 3 MMOL/L (ref 3.4–5.3)
POTASSIUM SERPL-SCNC: 3 MMOL/L (ref 3.5–5.1)
POTASSIUM SERPL-SCNC: 3.1 MMOL/L (ref 3.5–5.1)
POTASSIUM SERPL-SCNC: 3.1 MMOL/L (ref 3.5–5.1)
POTASSIUM SERPL-SCNC: 3.2 MMOL/L (ref 3.4–5.3)
POTASSIUM SERPL-SCNC: 3.5 MMOL/L (ref 3.5–5.1)
POTASSIUM SERPL-SCNC: 3.5 MMOL/L (ref 3.5–5.1)
POTASSIUM SERPL-SCNC: 3.6 MMOL/L (ref 3.4–5.3)
POTASSIUM SERPL-SCNC: 3.6 MMOL/L (ref 3.5–5.1)
POTASSIUM SERPL-SCNC: 3.7 MMOL/L (ref 3.5–5.1)
POTASSIUM SERPL-SCNC: 4.2 MMOL/L (ref 3.5–5.1)
POTASSIUM SERPL-SCNC: 4.2 MMOL/L (ref 3.5–5.1)
POTASSIUM SERPL-SCNC: 4.4 MMOL/L (ref 3.5–5.1)
PROT SERPL-MCNC: 4.7 GM/DL (ref 5.8–7.6)
PROT SERPL-MCNC: 5.1 GM/DL (ref 5.8–7.6)
PROT SERPL-MCNC: 5.4 GM/DL (ref 5.8–7.6)
PROT SERPL-MCNC: 6 GM/DL (ref 5.8–7.6)
PROT SERPL-MCNC: 8.2 GM/DL (ref 5.8–7.6)
PROTHROMBIN TIME: 13.5 SECONDS (ref 12.5–14.5)
PROTHROMBIN TIME: 19.8 SECONDS (ref 12.5–14.5)
PV PEAK VELOCITY: 1.29 CM/S
RBC # BLD AUTO: 4.11 X10(6)/MCL (ref 4.2–5.4)
RBC # BLD AUTO: 4.36 X10(6)/MCL (ref 4.2–5.4)
RBC # BLD AUTO: 4.78 X10(6)/MCL (ref 4.2–5.4)
RBC # BLD AUTO: 4.91 X10(6)/MCL (ref 4.2–5.4)
RBC # BLD AUTO: 5.04 X10(6)/MCL (ref 4.2–5.4)
RBC # BLD AUTO: 5.29 X10(6)/MCL (ref 4.2–5.4)
RBC # BLD AUTO: 5.29 X10(6)/MCL (ref 4.2–5.4)
RBC MORPH BLD: ABNORMAL
RBC MORPH BLD: NORMAL
SAMPLE: ABNORMAL
SAMPLE: ABNORMAL
SARS-COV-2 RNA RESP QL NAA+PROBE: NOT DETECTED
SATURATED O2 ARTERIAL, I-STAT: ABNORMAL
SATURATED O2 ARTERIAL, I-STAT: NORMAL
SODIUM BLD-SCNC: 135 MMOL/L (ref 137–147)
SODIUM BLD-SCNC: 136 MMOL/L (ref 137–147)
SODIUM BLD-SCNC: 140 MMOL/L (ref 136–145)
SODIUM BLD-SCNC: 144 MMOL/L (ref 137–147)
SODIUM BLD-SCNC: ABNORMAL MMOL/L
SODIUM BLD-SCNC: NORMAL MMOL/L
SODIUM BLOOD ARTERIAL: 135
SODIUM BLOOD ARTERIAL: 139
SODIUM BLOOD ARTERIAL: 145
SODIUM SERPL-SCNC: 137 MMOL/L (ref 136–145)
SODIUM SERPL-SCNC: 137 MMOL/L (ref 136–145)
SODIUM SERPL-SCNC: 140 MMOL/L (ref 136–145)
SODIUM SERPL-SCNC: 140 MMOL/L (ref 136–145)
SODIUM SERPL-SCNC: 141 MMOL/L (ref 136–145)
SODIUM SERPL-SCNC: 141 MMOL/L (ref 136–145)
SODIUM SERPL-SCNC: 142 MMOL/L (ref 136–145)
SODIUM SERPL-SCNC: 142 MMOL/L (ref 136–145)
SODIUM SERPL-SCNC: 144 MMOL/L (ref 136–145)
SODIUM SERPL-SCNC: 146 MMOL/L (ref 136–145)
SODIUM SERPL-SCNC: 149 MMOL/L (ref 136–145)
T PALLIDUM AB SER QL: NONREACTIVE
T PALLIDUM AB SER QL: NORMAL
TDI LATERAL: 0.08 M/S
TDI SEPTAL: 0.08 M/S
TDI: 0.08 M/S
TEAR DROP CELL (OLG): ABNORMAL
TEAR DROP CELL (OLG): ABNORMAL
TRIGL SERPL-MCNC: 453 MG/DL (ref 37–140)
TROPONIN I SERPL-MCNC: 0.14 NG/ML (ref 0–0.04)
TROPONIN I SERPL-MCNC: 14.61 NG/ML (ref 0–0.04)
TROPONIN I SERPL-MCNC: 42.35 NG/ML (ref 0–0.04)
VLDLC SERPL CALC-MCNC: 91 MG/DL
WBC # SPEC AUTO: 11.4 X10(3)/MCL (ref 4.5–11.5)
WBC # SPEC AUTO: 15.6 X10(3)/MCL (ref 4.5–11.5)
WBC # SPEC AUTO: 19.4 X10(3)/MCL (ref 4.5–11.5)
WBC # SPEC AUTO: 19.7 X10(3)/MCL (ref 4.5–11.5)
WBC # SPEC AUTO: 20.2 X10(3)/MCL (ref 4.5–11.5)
WBC # SPEC AUTO: 7.8 X10(3)/MCL (ref 4.5–11.5)
WBC # SPEC AUTO: 9.1 X10(3)/MCL (ref 4.5–11.5)

## 2022-01-01 PROCEDURE — A4216 STERILE WATER/SALINE, 10 ML: HCPCS | Performed by: INTERNAL MEDICINE

## 2022-01-01 PROCEDURE — 31500 INSERT EMERGENCY AIRWAY: CPT

## 2022-01-01 PROCEDURE — 99900035 HC TECH TIME PER 15 MIN (STAT)

## 2022-01-01 PROCEDURE — 36415 COLL VENOUS BLD VENIPUNCTURE: CPT | Performed by: STUDENT IN AN ORGANIZED HEALTH CARE EDUCATION/TRAINING PROGRAM

## 2022-01-01 PROCEDURE — 27100171 HC OXYGEN HIGH FLOW UP TO 24 HOURS

## 2022-01-01 PROCEDURE — 87635 SARS-COV-2 COVID-19 AMP PRB: CPT | Performed by: EMERGENCY MEDICINE

## 2022-01-01 PROCEDURE — 25500020 PHARM REV CODE 255: Performed by: EMERGENCY MEDICINE

## 2022-01-01 PROCEDURE — 63600175 PHARM REV CODE 636 W HCPCS: Performed by: INTERNAL MEDICINE

## 2022-01-01 PROCEDURE — 25000003 PHARM REV CODE 250: Performed by: STUDENT IN AN ORGANIZED HEALTH CARE EDUCATION/TRAINING PROGRAM

## 2022-01-01 PROCEDURE — 84100 ASSAY OF PHOSPHORUS: CPT | Performed by: INTERNAL MEDICINE

## 2022-01-01 PROCEDURE — 27000221 HC OXYGEN, UP TO 24 HOURS

## 2022-01-01 PROCEDURE — 84075 ASSAY ALKALINE PHOSPHATASE: CPT | Performed by: STUDENT IN AN ORGANIZED HEALTH CARE EDUCATION/TRAINING PROGRAM

## 2022-01-01 PROCEDURE — 84100 ASSAY OF PHOSPHORUS: CPT | Performed by: STUDENT IN AN ORGANIZED HEALTH CARE EDUCATION/TRAINING PROGRAM

## 2022-01-01 PROCEDURE — 85610 PROTHROMBIN TIME: CPT | Performed by: STUDENT IN AN ORGANIZED HEALTH CARE EDUCATION/TRAINING PROGRAM

## 2022-01-01 PROCEDURE — 85007 BL SMEAR W/DIFF WBC COUNT: CPT | Performed by: EMERGENCY MEDICINE

## 2022-01-01 PROCEDURE — C9113 INJ PANTOPRAZOLE SODIUM, VIA: HCPCS | Performed by: INTERNAL MEDICINE

## 2022-01-01 PROCEDURE — 85025 COMPLETE CBC W/AUTO DIFF WBC: CPT | Performed by: STUDENT IN AN ORGANIZED HEALTH CARE EDUCATION/TRAINING PROGRAM

## 2022-01-01 PROCEDURE — 85007 BL SMEAR W/DIFF WBC COUNT: CPT | Performed by: STUDENT IN AN ORGANIZED HEALTH CARE EDUCATION/TRAINING PROGRAM

## 2022-01-01 PROCEDURE — 94003 VENT MGMT INPAT SUBQ DAY: CPT

## 2022-01-01 PROCEDURE — 93005 ELECTROCARDIOGRAM TRACING: CPT

## 2022-01-01 PROCEDURE — 63600175 PHARM REV CODE 636 W HCPCS: Performed by: STUDENT IN AN ORGANIZED HEALTH CARE EDUCATION/TRAINING PROGRAM

## 2022-01-01 PROCEDURE — 87070 CULTURE OTHR SPECIMN AEROBIC: CPT | Performed by: DERMATOLOGY

## 2022-01-01 PROCEDURE — 25000003 PHARM REV CODE 250: Performed by: EMERGENCY MEDICINE

## 2022-01-01 PROCEDURE — 83735 ASSAY OF MAGNESIUM: CPT | Performed by: STUDENT IN AN ORGANIZED HEALTH CARE EDUCATION/TRAINING PROGRAM

## 2022-01-01 PROCEDURE — 83605 ASSAY OF LACTIC ACID: CPT | Performed by: EMERGENCY MEDICINE

## 2022-01-01 PROCEDURE — 36592 COLLECT BLOOD FROM PICC: CPT | Performed by: STUDENT IN AN ORGANIZED HEALTH CARE EDUCATION/TRAINING PROGRAM

## 2022-01-01 PROCEDURE — 37799 UNLISTED PX VASCULAR SURGERY: CPT

## 2022-01-01 PROCEDURE — 83735 ASSAY OF MAGNESIUM: CPT | Performed by: INTERNAL MEDICINE

## 2022-01-01 PROCEDURE — 94761 N-INVAS EAR/PLS OXIMETRY MLT: CPT

## 2022-01-01 PROCEDURE — 85730 THROMBOPLASTIN TIME PARTIAL: CPT | Performed by: INTERNAL MEDICINE

## 2022-01-01 PROCEDURE — 93010 EKG 12-LEAD: ICD-10-PCS | Mod: ,,, | Performed by: INTERNAL MEDICINE

## 2022-01-01 PROCEDURE — 93010 ELECTROCARDIOGRAM REPORT: CPT | Mod: ,,, | Performed by: INTERNAL MEDICINE

## 2022-01-01 PROCEDURE — 84484 ASSAY OF TROPONIN QUANT: CPT | Performed by: EMERGENCY MEDICINE

## 2022-01-01 PROCEDURE — 27000207 HC ISOLATION

## 2022-01-01 PROCEDURE — 99199 UNLISTED SPECIAL SVC PX/RPRT: CPT

## 2022-01-01 PROCEDURE — 87641 MR-STAPH DNA AMP PROBE: CPT | Performed by: STUDENT IN AN ORGANIZED HEALTH CARE EDUCATION/TRAINING PROGRAM

## 2022-01-01 PROCEDURE — 80053 COMPREHEN METABOLIC PANEL: CPT | Performed by: STUDENT IN AN ORGANIZED HEALTH CARE EDUCATION/TRAINING PROGRAM

## 2022-01-01 PROCEDURE — C1751 CATH, INF, PER/CENT/MIDLINE: HCPCS

## 2022-01-01 PROCEDURE — 93010 ELECTROCARDIOGRAM REPORT: CPT | Mod: 76,,, | Performed by: INTERNAL MEDICINE

## 2022-01-01 PROCEDURE — 36591 DRAW BLOOD OFF VENOUS DEVICE: CPT | Performed by: STUDENT IN AN ORGANIZED HEALTH CARE EDUCATION/TRAINING PROGRAM

## 2022-01-01 PROCEDURE — 25000003 PHARM REV CODE 250: Performed by: INTERNAL MEDICINE

## 2022-01-01 PROCEDURE — 80053 COMPREHEN METABOLIC PANEL: CPT | Performed by: EMERGENCY MEDICINE

## 2022-01-01 PROCEDURE — 99900026 HC AIRWAY MAINTENANCE (STAT)

## 2022-01-01 PROCEDURE — 36600 WITHDRAWAL OF ARTERIAL BLOOD: CPT

## 2022-01-01 PROCEDURE — 25000003 PHARM REV CODE 250

## 2022-01-01 PROCEDURE — 36415 COLL VENOUS BLD VENIPUNCTURE: CPT | Performed by: EMERGENCY MEDICINE

## 2022-01-01 PROCEDURE — 99900031 HC PATIENT EDUCATION (STAT)

## 2022-01-01 PROCEDURE — 27200966 HC CLOSED SUCTION SYSTEM

## 2022-01-01 PROCEDURE — 63600175 PHARM REV CODE 636 W HCPCS: Performed by: EMERGENCY MEDICINE

## 2022-01-01 PROCEDURE — 82374 ASSAY BLOOD CARBON DIOXIDE: CPT | Performed by: STUDENT IN AN ORGANIZED HEALTH CARE EDUCATION/TRAINING PROGRAM

## 2022-01-01 PROCEDURE — 80048 BASIC METABOLIC PNL TOTAL CA: CPT | Mod: XB | Performed by: STUDENT IN AN ORGANIZED HEALTH CARE EDUCATION/TRAINING PROGRAM

## 2022-01-01 PROCEDURE — 93010 EKG 12-LEAD: ICD-10-PCS | Mod: 76,,, | Performed by: INTERNAL MEDICINE

## 2022-01-01 PROCEDURE — 36569 INSJ PICC 5 YR+ W/O IMAGING: CPT

## 2022-01-01 PROCEDURE — 36620 INSERTION CATHETER ARTERY: CPT

## 2022-01-01 PROCEDURE — 27201640 HC PAD, ARTICGEL

## 2022-01-01 PROCEDURE — 27202364 HC PAD, COOLING, ANY SIZE

## 2022-01-01 PROCEDURE — 96376 TX/PRO/DX INJ SAME DRUG ADON: CPT

## 2022-01-01 PROCEDURE — 85730 THROMBOPLASTIN TIME PARTIAL: CPT | Performed by: STUDENT IN AN ORGANIZED HEALTH CARE EDUCATION/TRAINING PROGRAM

## 2022-01-01 PROCEDURE — 99231 SBSQ HOSP IP/OBS SF/LOW 25: CPT | Mod: ,,, | Performed by: NURSE PRACTITIONER

## 2022-01-01 PROCEDURE — 20000000 HC ICU ROOM

## 2022-01-01 PROCEDURE — 82803 BLOOD GASES ANY COMBINATION: CPT

## 2022-01-01 PROCEDURE — 63600175 PHARM REV CODE 636 W HCPCS

## 2022-01-01 PROCEDURE — 94002 VENT MGMT INPAT INIT DAY: CPT

## 2022-01-01 PROCEDURE — 99292 CRITICAL CARE ADDL 30 MIN: CPT | Mod: 25

## 2022-01-01 PROCEDURE — 86780 TREPONEMA PALLIDUM: CPT | Performed by: INTERNAL MEDICINE

## 2022-01-01 PROCEDURE — 99231 PR SUBSEQUENT HOSPITAL CARE,LEVL I: ICD-10-PCS | Mod: ,,, | Performed by: NURSE PRACTITIONER

## 2022-01-01 PROCEDURE — 80074 ACUTE HEPATITIS PANEL: CPT | Performed by: STUDENT IN AN ORGANIZED HEALTH CARE EDUCATION/TRAINING PROGRAM

## 2022-01-01 PROCEDURE — 93010 ELECTROCARDIOGRAM REPORT: CPT | Mod: 77,,, | Performed by: INTERNAL MEDICINE

## 2022-01-01 PROCEDURE — 83036 HEMOGLOBIN GLYCOSYLATED A1C: CPT | Performed by: STUDENT IN AN ORGANIZED HEALTH CARE EDUCATION/TRAINING PROGRAM

## 2022-01-01 PROCEDURE — 87389 HIV-1 AG W/HIV-1&-2 AB AG IA: CPT | Performed by: STUDENT IN AN ORGANIZED HEALTH CARE EDUCATION/TRAINING PROGRAM

## 2022-01-01 PROCEDURE — 84484 ASSAY OF TROPONIN QUANT: CPT | Performed by: STUDENT IN AN ORGANIZED HEALTH CARE EDUCATION/TRAINING PROGRAM

## 2022-01-01 PROCEDURE — 83880 ASSAY OF NATRIURETIC PEPTIDE: CPT | Performed by: EMERGENCY MEDICINE

## 2022-01-01 PROCEDURE — 96375 TX/PRO/DX INJ NEW DRUG ADDON: CPT

## 2022-01-01 PROCEDURE — 87040 BLOOD CULTURE FOR BACTERIA: CPT | Performed by: STUDENT IN AN ORGANIZED HEALTH CARE EDUCATION/TRAINING PROGRAM

## 2022-01-01 PROCEDURE — 84484 ASSAY OF TROPONIN QUANT: CPT | Performed by: INTERNAL MEDICINE

## 2022-01-01 PROCEDURE — 85025 COMPLETE CBC W/AUTO DIFF WBC: CPT | Performed by: EMERGENCY MEDICINE

## 2022-01-01 PROCEDURE — 87070 CULTURE OTHR SPECIMN AEROBIC: CPT | Performed by: INTERNAL MEDICINE

## 2022-01-01 PROCEDURE — 99291 CRITICAL CARE FIRST HOUR: CPT | Mod: 25

## 2022-01-01 PROCEDURE — 99222 PR INITIAL HOSPITAL CARE,LEVL II: ICD-10-PCS | Mod: ,,, | Performed by: NURSE PRACTITIONER

## 2022-01-01 PROCEDURE — 85730 THROMBOPLASTIN TIME PARTIAL: CPT | Performed by: EMERGENCY MEDICINE

## 2022-01-01 PROCEDURE — 80061 LIPID PANEL: CPT | Performed by: STUDENT IN AN ORGANIZED HEALTH CARE EDUCATION/TRAINING PROGRAM

## 2022-01-01 PROCEDURE — 84484 ASSAY OF TROPONIN QUANT: CPT

## 2022-01-01 PROCEDURE — 96374 THER/PROPH/DIAG INJ IV PUSH: CPT | Mod: 59

## 2022-01-01 PROCEDURE — 85610 PROTHROMBIN TIME: CPT | Performed by: EMERGENCY MEDICINE

## 2022-01-01 PROCEDURE — 99222 1ST HOSP IP/OBS MODERATE 55: CPT | Mod: ,,, | Performed by: NURSE PRACTITIONER

## 2022-01-01 RX ORDER — POTASSIUM CHLORIDE 14.9 MG/ML
40 INJECTION INTRAVENOUS ONCE
Status: DISCONTINUED | OUTPATIENT
Start: 2022-01-01 | End: 2022-01-01

## 2022-01-01 RX ORDER — DEXTROSE MONOHYDRATE 100 MG/ML
INJECTION, SOLUTION INTRAVENOUS
Status: DISCONTINUED | OUTPATIENT
Start: 2022-01-01 | End: 2022-05-12 | Stop reason: HOSPADM

## 2022-01-01 RX ORDER — SODIUM BICARBONATE 1 MEQ/ML
SYRINGE (ML) INTRAVENOUS
Status: COMPLETED
Start: 2022-01-01 | End: 2022-01-01

## 2022-01-01 RX ORDER — SODIUM CHLORIDE 0.9 % (FLUSH) 0.9 %
10 SYRINGE (ML) INJECTION
Status: DISCONTINUED | OUTPATIENT
Start: 2022-01-01 | End: 2022-05-12 | Stop reason: HOSPADM

## 2022-01-01 RX ORDER — BUSPIRONE HYDROCHLORIDE 10 MG/1
30 TABLET ORAL ONCE
Status: DISCONTINUED | OUTPATIENT
Start: 2022-01-01 | End: 2022-01-01

## 2022-01-01 RX ORDER — HEPARIN SODIUM,PORCINE/D5W 25000/250
0-40 INTRAVENOUS SOLUTION INTRAVENOUS CONTINUOUS
Status: DISCONTINUED | OUTPATIENT
Start: 2022-01-01 | End: 2022-01-01

## 2022-01-01 RX ORDER — INDOMETHACIN 25 MG/1
100 CAPSULE ORAL ONCE
Status: COMPLETED | OUTPATIENT
Start: 2022-01-01 | End: 2022-01-01

## 2022-01-01 RX ORDER — NOREPINEPHRINE BITARTRATE 0.03 MG/ML
0.05 INJECTION, SOLUTION INTRAVENOUS CONTINUOUS
Status: DISCONTINUED | OUTPATIENT
Start: 2022-01-01 | End: 2022-05-12 | Stop reason: HOSPADM

## 2022-01-01 RX ORDER — MAGNESIUM SULFATE HEPTAHYDRATE 40 MG/ML
2 INJECTION, SOLUTION INTRAVENOUS
Status: COMPLETED | OUTPATIENT
Start: 2022-01-01 | End: 2022-01-01

## 2022-01-01 RX ORDER — FUROSEMIDE 10 MG/ML
40 INJECTION INTRAMUSCULAR; INTRAVENOUS ONCE
Status: DISCONTINUED | OUTPATIENT
Start: 2022-01-01 | End: 2022-01-01

## 2022-01-01 RX ORDER — ENOXAPARIN SODIUM 100 MG/ML
40 INJECTION SUBCUTANEOUS EVERY 24 HOURS
Status: DISCONTINUED | OUTPATIENT
Start: 2022-01-01 | End: 2022-01-01

## 2022-01-01 RX ORDER — SODIUM BICARBONATE 1 MEQ/ML
SYRINGE (ML) INTRAVENOUS CODE/TRAUMA/SEDATION MEDICATION
Status: COMPLETED | OUTPATIENT
Start: 2022-01-01 | End: 2022-01-01

## 2022-01-01 RX ORDER — POTASSIUM CHLORIDE 14.9 MG/ML
40 INJECTION INTRAVENOUS EVERY 4 HOURS
Status: DISCONTINUED | OUTPATIENT
Start: 2022-01-01 | End: 2022-01-01

## 2022-01-01 RX ORDER — POTASSIUM CHLORIDE 14.9 MG/ML
40 INJECTION INTRAVENOUS EVERY 4 HOURS
Status: COMPLETED | OUTPATIENT
Start: 2022-01-01 | End: 2022-01-01

## 2022-01-01 RX ORDER — POTASSIUM CHLORIDE 14.9 MG/ML
40 INJECTION INTRAVENOUS ONCE
Status: COMPLETED | OUTPATIENT
Start: 2022-01-01 | End: 2022-01-01

## 2022-01-01 RX ORDER — CLOPIDOGREL BISULFATE 75 MG/1
300 TABLET ORAL ONCE
Status: COMPLETED | OUTPATIENT
Start: 2022-01-01 | End: 2022-01-01

## 2022-01-01 RX ORDER — MORPHINE SULFATE 4 MG/ML
2 INJECTION, SOLUTION INTRAMUSCULAR; INTRAVENOUS EVERY 30 MIN PRN
Status: DISCONTINUED | OUTPATIENT
Start: 2022-01-01 | End: 2022-05-12 | Stop reason: HOSPADM

## 2022-01-01 RX ORDER — MORPHINE SULFATE 4 MG/ML
1 INJECTION, SOLUTION INTRAMUSCULAR; INTRAVENOUS
Status: DISCONTINUED | OUTPATIENT
Start: 2022-01-01 | End: 2022-05-12 | Stop reason: HOSPADM

## 2022-01-01 RX ORDER — NAPROXEN SODIUM 220 MG/1
324 TABLET, FILM COATED ORAL ONCE
Status: COMPLETED | OUTPATIENT
Start: 2022-01-01 | End: 2022-01-01

## 2022-01-01 RX ORDER — FUROSEMIDE 10 MG/ML
20 INJECTION INTRAMUSCULAR; INTRAVENOUS
Status: DISCONTINUED | OUTPATIENT
Start: 2022-01-01 | End: 2022-05-12 | Stop reason: HOSPADM

## 2022-01-01 RX ORDER — PROPOFOL 10 MG/ML
INJECTION, EMULSION INTRAVENOUS
Status: COMPLETED
Start: 2022-01-01 | End: 2022-01-01

## 2022-01-01 RX ORDER — PROPOFOL 10 MG/ML
0-50 INJECTION, EMULSION INTRAVENOUS CONTINUOUS
Status: DISCONTINUED | OUTPATIENT
Start: 2022-01-01 | End: 2022-05-12 | Stop reason: HOSPADM

## 2022-01-01 RX ORDER — LORAZEPAM 2 MG/ML
INJECTION INTRAMUSCULAR
Status: COMPLETED
Start: 2022-01-01 | End: 2022-01-01

## 2022-01-01 RX ORDER — ACETAMINOPHEN 650 MG/20.3ML
650 LIQUID ORAL EVERY 6 HOURS
Status: DISCONTINUED | OUTPATIENT
Start: 2022-01-01 | End: 2022-01-01

## 2022-01-01 RX ORDER — TALC
6 POWDER (GRAM) TOPICAL NIGHTLY PRN
Status: DISCONTINUED | OUTPATIENT
Start: 2022-01-01 | End: 2022-05-12 | Stop reason: HOSPADM

## 2022-01-01 RX ORDER — PANTOPRAZOLE SODIUM 40 MG/10ML
40 INJECTION, POWDER, LYOPHILIZED, FOR SOLUTION INTRAVENOUS DAILY
Status: DISCONTINUED | OUTPATIENT
Start: 2022-01-01 | End: 2022-05-12 | Stop reason: HOSPADM

## 2022-01-01 RX ORDER — LORAZEPAM 2 MG/ML
2 INJECTION INTRAMUSCULAR
Status: DISCONTINUED | OUTPATIENT
Start: 2022-01-01 | End: 2022-01-01

## 2022-01-01 RX ORDER — LORAZEPAM 2 MG/ML
1 INJECTION INTRAMUSCULAR EVERY 30 MIN PRN
Status: DISCONTINUED | OUTPATIENT
Start: 2022-01-01 | End: 2022-05-12 | Stop reason: HOSPADM

## 2022-01-01 RX ORDER — SODIUM BICARBONATE 1 MEQ/ML
100 SYRINGE (ML) INTRAVENOUS ONCE
Status: COMPLETED | OUTPATIENT
Start: 2022-01-01 | End: 2022-01-01

## 2022-01-01 RX ORDER — POTASSIUM CHLORIDE 14.9 MG/ML
20 INJECTION INTRAVENOUS ONCE
Status: DISCONTINUED | OUTPATIENT
Start: 2022-01-01 | End: 2022-01-01

## 2022-01-01 RX ORDER — HYDRALAZINE HYDROCHLORIDE 20 MG/ML
10 INJECTION INTRAMUSCULAR; INTRAVENOUS EVERY 4 HOURS PRN
Status: DISCONTINUED | OUTPATIENT
Start: 2022-01-01 | End: 2022-05-12 | Stop reason: HOSPADM

## 2022-01-01 RX ORDER — SODIUM CHLORIDE, SODIUM LACTATE, POTASSIUM CHLORIDE, CALCIUM CHLORIDE 600; 310; 30; 20 MG/100ML; MG/100ML; MG/100ML; MG/100ML
INJECTION, SOLUTION INTRAVENOUS CONTINUOUS
Status: DISCONTINUED | OUTPATIENT
Start: 2022-01-01 | End: 2022-05-12 | Stop reason: HOSPADM

## 2022-01-01 RX ORDER — NOREPINEPHRINE BITARTRATE 1 MG/ML
INJECTION, SOLUTION INTRAVENOUS
Status: COMPLETED
Start: 2022-01-01 | End: 2022-01-01

## 2022-01-01 RX ORDER — LORAZEPAM 2 MG/ML
2 INJECTION INTRAMUSCULAR EVERY 10 MIN PRN
Status: DISCONTINUED | OUTPATIENT
Start: 2022-01-01 | End: 2022-05-12 | Stop reason: HOSPADM

## 2022-01-01 RX ORDER — INDOMETHACIN 25 MG/1
50 CAPSULE ORAL ONCE
Status: COMPLETED | OUTPATIENT
Start: 2022-01-01 | End: 2022-01-01

## 2022-01-01 RX ORDER — SODIUM CHLORIDE 0.9 % (FLUSH) 0.9 %
10 SYRINGE (ML) INJECTION EVERY 6 HOURS
Status: DISCONTINUED | OUTPATIENT
Start: 2022-01-01 | End: 2022-05-12 | Stop reason: HOSPADM

## 2022-01-01 RX ORDER — SODIUM BICARBONATE 1 MEQ/ML
50 SYRINGE (ML) INTRAVENOUS
Status: COMPLETED | OUTPATIENT
Start: 2022-01-01 | End: 2022-01-01

## 2022-01-01 RX ORDER — EPINEPHRINE 0.1 MG/ML
INJECTION INTRAVENOUS CODE/TRAUMA/SEDATION MEDICATION
Status: COMPLETED | OUTPATIENT
Start: 2022-01-01 | End: 2022-01-01

## 2022-01-01 RX ORDER — EPINEPHRINE 1 MG/ML
INJECTION, SOLUTION INTRACARDIAC; INTRAMUSCULAR; INTRAVENOUS; SUBCUTANEOUS
Status: DISPENSED
Start: 2022-01-01 | End: 2022-01-01

## 2022-01-01 RX ADMIN — Medication 0.05 MCG/KG/MIN: at 01:05

## 2022-01-01 RX ADMIN — PIPERACILLIN SODIUM AND TAZOBACTAM SODIUM 4.5 G: 4; .5 INJECTION, POWDER, LYOPHILIZED, FOR SOLUTION INTRAVENOUS at 12:05

## 2022-01-01 RX ADMIN — PROPOFOL 25 MCG/KG/MIN: 10 INJECTION, EMULSION INTRAVENOUS at 08:05

## 2022-01-01 RX ADMIN — INDOMETHACIN 100 MEQ: 25 CAPSULE ORAL at 04:05

## 2022-01-01 RX ADMIN — Medication 0.46 MCG/KG/MIN: at 03:05

## 2022-01-01 RX ADMIN — Medication 0.46 MCG/KG/MIN: at 12:05

## 2022-01-01 RX ADMIN — Medication 0.22 MCG/KG/MIN: at 01:05

## 2022-01-01 RX ADMIN — SODIUM CHLORIDE, POTASSIUM CHLORIDE, SODIUM LACTATE AND CALCIUM CHLORIDE 1000 ML: 600; 310; 30; 20 INJECTION, SOLUTION INTRAVENOUS at 11:05

## 2022-01-01 RX ADMIN — SODIUM CHLORIDE, PRESERVATIVE FREE 10 ML: 5 INJECTION INTRAVENOUS at 11:05

## 2022-01-01 RX ADMIN — SODIUM BICARBONATE 100 MEQ: 84 INJECTION, SOLUTION INTRAVENOUS at 11:05

## 2022-01-01 RX ADMIN — PIPERACILLIN SODIUM AND TAZOBACTAM SODIUM 4.5 G: 4; .5 INJECTION, POWDER, LYOPHILIZED, FOR SOLUTION INTRAVENOUS at 04:05

## 2022-01-01 RX ADMIN — PROPOFOL 20 MCG/KG/MIN: 10 INJECTION, EMULSION INTRAVENOUS at 05:05

## 2022-01-01 RX ADMIN — SODIUM CHLORIDE, PRESERVATIVE FREE 10 ML: 5 INJECTION INTRAVENOUS at 05:05

## 2022-01-01 RX ADMIN — MORPHINE SULFATE 2 MG: 4 INJECTION INTRAVENOUS at 06:05

## 2022-01-01 RX ADMIN — PROPOFOL 50 MCG/KG/MIN: 10 INJECTION, EMULSION INTRAVENOUS at 08:05

## 2022-01-01 RX ADMIN — SODIUM BICARBONATE 50 MEQ: 84 INJECTION, SOLUTION INTRAVENOUS at 06:05

## 2022-01-01 RX ADMIN — PIPERACILLIN SODIUM AND TAZOBACTAM SODIUM 4.5 G: 4; .5 INJECTION, POWDER, LYOPHILIZED, FOR SOLUTION INTRAVENOUS at 08:05

## 2022-01-01 RX ADMIN — DOXYCYCLINE 100 MG: 100 INJECTION, POWDER, LYOPHILIZED, FOR SOLUTION INTRAVENOUS at 05:05

## 2022-01-01 RX ADMIN — LEVETIRACETAM 500 MG: 100 INJECTION, SOLUTION INTRAVENOUS at 08:05

## 2022-01-01 RX ADMIN — SODIUM BICARBONATE 100 MEQ: 84 INJECTION, SOLUTION INTRAVENOUS at 04:05

## 2022-01-01 RX ADMIN — PROPOFOL 30 MCG/KG/MIN: 10 INJECTION, EMULSION INTRAVENOUS at 05:05

## 2022-01-01 RX ADMIN — SODIUM CHLORIDE, PRESERVATIVE FREE 10 ML: 5 INJECTION INTRAVENOUS at 02:05

## 2022-01-01 RX ADMIN — EPINEPHRINE 0.1 MG: 0.1 INJECTION INTRACARDIAC; INTRAVENOUS at 01:05

## 2022-01-01 RX ADMIN — PROPOFOL 35 MCG/KG/MIN: 10 INJECTION, EMULSION INTRAVENOUS at 12:05

## 2022-01-01 RX ADMIN — Medication 0.5 MCG/KG/MIN: at 06:05

## 2022-01-01 RX ADMIN — POTASSIUM CHLORIDE 40 MEQ: 200 INJECTION, SOLUTION INTRAVENOUS at 11:05

## 2022-01-01 RX ADMIN — SODIUM CHLORIDE, PRESERVATIVE FREE 10 ML: 5 INJECTION INTRAVENOUS at 08:05

## 2022-01-01 RX ADMIN — PANTOPRAZOLE SODIUM 40 MG: 40 INJECTION, POWDER, FOR SOLUTION INTRAVENOUS at 08:05

## 2022-01-01 RX ADMIN — ASPIRIN 81 MG CHEWABLE TABLET 324 MG: 81 TABLET CHEWABLE at 08:05

## 2022-01-01 RX ADMIN — INSULIN HUMAN 12 UNITS/HR: 1 INJECTION, SOLUTION INTRAVENOUS at 02:05

## 2022-01-01 RX ADMIN — Medication 0.46 MCG/KG/MIN: at 10:05

## 2022-01-01 RX ADMIN — POTASSIUM CHLORIDE 40 MEQ: 200 INJECTION, SOLUTION INTRAVENOUS at 12:05

## 2022-01-01 RX ADMIN — Medication 0.08 MCG/KG/MIN: at 01:05

## 2022-01-01 RX ADMIN — SODIUM CHLORIDE 1000 ML: 9 INJECTION, SOLUTION INTRAVENOUS at 05:05

## 2022-01-01 RX ADMIN — INSULIN HUMAN 30 UNITS/HR: 1 INJECTION, SOLUTION INTRAVENOUS at 09:05

## 2022-01-01 RX ADMIN — SODIUM BICARBONATE: 84 INJECTION, SOLUTION INTRAVENOUS at 06:05

## 2022-01-01 RX ADMIN — FUROSEMIDE 20 MG: 10 INJECTION, SOLUTION INTRAMUSCULAR; INTRAVENOUS at 04:05

## 2022-01-01 RX ADMIN — LORAZEPAM 2 MG: 2 INJECTION INTRAMUSCULAR; INTRAVENOUS at 06:05

## 2022-01-01 RX ADMIN — LEVETIRACETAM 500 MG: 100 INJECTION, SOLUTION INTRAVENOUS at 09:05

## 2022-01-01 RX ADMIN — Medication 100 MEQ: at 11:05

## 2022-01-01 RX ADMIN — PROPOFOL 30 MCG/KG/MIN: 10 INJECTION, EMULSION INTRAVENOUS at 09:05

## 2022-01-01 RX ADMIN — SODIUM BICARBONATE 50 MEQ: 84 INJECTION INTRAVENOUS at 01:05

## 2022-01-01 RX ADMIN — POTASSIUM CHLORIDE 40 MEQ: 200 INJECTION, SOLUTION INTRAVENOUS at 10:05

## 2022-01-01 RX ADMIN — SODIUM CHLORIDE, POTASSIUM CHLORIDE, SODIUM LACTATE AND CALCIUM CHLORIDE: 600; 310; 30; 20 INJECTION, SOLUTION INTRAVENOUS at 06:05

## 2022-01-01 RX ADMIN — SODIUM CHLORIDE, PRESERVATIVE FREE 10 ML: 5 INJECTION INTRAVENOUS at 12:05

## 2022-01-01 RX ADMIN — INSULIN HUMAN 20 UNITS/HR: 1 INJECTION, SOLUTION INTRAVENOUS at 01:05

## 2022-01-01 RX ADMIN — INSULIN HUMAN 12.2 UNITS/HR: 1 INJECTION, SOLUTION INTRAVENOUS at 03:05

## 2022-01-01 RX ADMIN — PIPERACILLIN SODIUM AND TAZOBACTAM SODIUM 4.5 G: 4; .5 INJECTION, POWDER, LYOPHILIZED, FOR SOLUTION INTRAVENOUS at 01:05

## 2022-01-01 RX ADMIN — INSULIN HUMAN 2 UNITS/HR: 1 INJECTION, SOLUTION INTRAVENOUS at 07:05

## 2022-01-01 RX ADMIN — LORAZEPAM 2 MG: 2 INJECTION INTRAMUSCULAR; INTRAVENOUS at 04:05

## 2022-01-01 RX ADMIN — VANCOMYCIN HYDROCHLORIDE 1500 MG: 1.5 INJECTION, POWDER, LYOPHILIZED, FOR SOLUTION INTRAVENOUS at 10:05

## 2022-01-01 RX ADMIN — PROPOFOL 15 MCG/KG/MIN: 10 INJECTION, EMULSION INTRAVENOUS at 05:05

## 2022-01-01 RX ADMIN — Medication 0.05 MCG/KG/MIN: at 05:05

## 2022-01-01 RX ADMIN — POTASSIUM CHLORIDE 40 MEQ: 200 INJECTION, SOLUTION INTRAVENOUS at 05:05

## 2022-01-01 RX ADMIN — SODIUM BICARBONATE 50 MEQ: 84 INJECTION INTRAVENOUS at 02:05

## 2022-01-01 RX ADMIN — Medication 0.2 MCG/KG/MIN: at 05:05

## 2022-01-01 RX ADMIN — LORAZEPAM 2 MG: 2 INJECTION INTRAMUSCULAR at 04:05

## 2022-01-01 RX ADMIN — HEPARIN SODIUM AND DEXTROSE 12 UNITS/KG/HR: 10000; 5 INJECTION INTRAVENOUS at 08:05

## 2022-01-01 RX ADMIN — INSULIN HUMAN 7 UNITS/HR: 1 INJECTION, SOLUTION INTRAVENOUS at 05:05

## 2022-01-01 RX ADMIN — PROPOFOL 15 MCG/KG/MIN: 10 INJECTION, EMULSION INTRAVENOUS at 08:05

## 2022-01-01 RX ADMIN — PROPOFOL 30 MCG/KG/MIN: 10 INJECTION, EMULSION INTRAVENOUS at 11:05

## 2022-01-01 RX ADMIN — IOPAMIDOL 100 ML: 755 INJECTION, SOLUTION INTRAVENOUS at 02:05

## 2022-01-01 RX ADMIN — FUROSEMIDE 20 MG: 10 INJECTION, SOLUTION INTRAMUSCULAR; INTRAVENOUS at 05:05

## 2022-01-01 RX ADMIN — PROPOFOL 10 MCG/KG/MIN: 10 INJECTION, EMULSION INTRAVENOUS at 04:05

## 2022-01-01 RX ADMIN — Medication 0.26 MCG/KG/MIN: at 05:05

## 2022-01-01 RX ADMIN — MAGNESIUM SULFATE HEPTAHYDRATE 2 G: 40 INJECTION, SOLUTION INTRAVENOUS at 05:05

## 2022-01-01 RX ADMIN — PROPOFOL 30 MCG/KG/MIN: 10 INJECTION, EMULSION INTRAVENOUS at 03:05

## 2022-01-01 RX ADMIN — HEPARIN SODIUM AND DEXTROSE 8 UNITS/KG/HR: 10000; 5 INJECTION INTRAVENOUS at 10:05

## 2022-01-01 RX ADMIN — SODIUM BICARBONATE: 84 INJECTION, SOLUTION INTRAVENOUS at 12:05

## 2022-01-01 RX ADMIN — Medication 0.5 MCG/KG/MIN: at 09:05

## 2022-01-01 RX ADMIN — NOREPINEPHRINE BITARTRATE 0.2 MCG/KG/MIN: 1 INJECTION INTRAVENOUS at 01:05

## 2022-01-01 RX ADMIN — PANTOPRAZOLE SODIUM 40 MG: 40 INJECTION, POWDER, FOR SOLUTION INTRAVENOUS at 11:05

## 2022-01-01 RX ADMIN — CLOPIDOGREL 300 MG: 75 TABLET, FILM COATED ORAL at 08:05

## 2022-05-08 PROBLEM — R06.02 SOB (SHORTNESS OF BREATH): Status: ACTIVE | Noted: 2022-01-01

## 2022-05-08 PROBLEM — R09.89 SUSPECTED CHF (CONGESTIVE HEART FAILURE): Status: ACTIVE | Noted: 2022-01-01

## 2022-05-08 PROBLEM — E87.20 ACIDOSIS: Status: ACTIVE | Noted: 2022-01-01

## 2022-05-08 PROBLEM — I46.9 CARDIOPULMONARY ARREST: Status: ACTIVE | Noted: 2022-01-01

## 2022-05-08 NOTE — CONSULTS
Consults   [address]  Cardiology  Consult Note    Patient Name: Millicent Arambula  MRN: [id number]  Admission Date: 5/8/2022  Hospital Length of Stay: 0 days  Code Status: DNR   Attending Provider: DERRICK Andrea MD   Consulting Provider: CLAUDETTE Booker  Primary Care Physician: Primary Doctor No  Principal Problem:Cardiopulmonary arrest    Patient information was obtained from spouse/SO and ER records.     Subjective:     Chief Complaint: Consultation Reason: Cardiac Arrest     HPI:  Ms. Arambula is a 72 year old female, unknown to [facility], who presented to the ED via EMS with primary complaint of respiratory difficulty. Patient arrived unresponsive with no detectable BP noted. Initial troponin noted to be 42. Her BNP noted to be 333. CXR with diffuse bilateral ground glass opacities. CTA Throrax revealed no evidence of acute PE. EKG revealed possible complete heart block with RBBB and LVH strain pattern. [facility] is consulted for cardiac evaluation given her cardiopulmonary arrest and NSTEMI.    PMH: DM, Recent Left Foot Pain, Possible Scabies  PSH:  unable to Recall (Nothing Recent)  Family History: Father- Heart Disease, Mother- Kidney Disease  Social History: Tobacco- Negative, ETOH- Negative, Substance Abuse- Negative    Review of patient's allergies indicates:   Allergen Reactions    Adhesive tape-silicones      Other reaction(s): SKIN COMES OFF       No current facility-administered medications on file prior to encounter.     No current outpatient medications on file prior to encounter.     Review of Systems:  Review of Systems   Reason unable to perform ROS: Clinical Condition.     Objective:     Vital Signs (Most Recent):  Temp: 99.5 °F (37.5 °C) (05/08/22 0500)  Pulse: 70 (05/08/22 0500)  Resp: 15 (05/08/22 0500)  BP: 95/72 (05/08/22 0500)  SpO2: 95 % (05/08/22 0500) Vital Signs (24h Range):  Temp:  [98.2 °F (36.8 °C)-100.4 °F (38 °C)] 99.5 °F (37.5 °C)  Pulse:  [] 70  Resp:   [14-71] 15  SpO2:  [50 %-100 %] 95 %  BP: ()/() 95/72     Weight: 100 kg (220 lb 7.4 oz)  Body mass index is 36.69 kg/m².    SpO2: 95 %  O2 Device (Oxygen Therapy): ventilator      Intake/Output Summary (Last 24 hours) at 5/8/2022 0651  Last data filed at 5/8/2022 0400  Gross per 24 hour   Intake --   Output 600 ml   Net -600 ml       Lines/Drains/Airways     Drain  Duration                NG/OG Tube 05/08/22 0110 Dimmit sump 18 Fr. Right mouth <1 day         Urethral Catheter 05/08/22 0109 Temperature probe 16 Fr. <1 day          Airway  Duration                Airway - Non-Surgical 05/08/22 0105 Endotracheal Tube <1 day          Peripheral Intravenous Line  Duration                Peripheral IV - Single Lumen 05/08/22 0058 20 G Right Antecubital <1 day         Peripheral IV - Single Lumen 05/08/22 0211 20 G Left Wrist <1 day              Significant Labs:  Recent Lab Results  (Last 5 results in the past 72 hours)      05/08/22  0600   05/08/22  0529   05/08/22  0455   05/08/22  0425   05/08/22  0226        Base Excess, Arterial     -11.2           Ionized Calcium     1.05           CO2 TOTAL     15.8           Estimated Avg Glucose 335.0               HCO3, Arterial     14.8           Hemoglobin A1C External 13.3               Magnesium   2.00             O2 Sat, Arterial     99           pCO2, Arterial     33           pH, Arterial     7.26           Phosphorus   5.8             pO2, Arterial     139           POC Cardiac Troponin I         0.87       POCT Glucose       >500         Potassium     3.6           Sample         unknown  Comment: A single negative troponin is insufficient to rule out myocardial infarction.  The use of a serial sampling protocol is recommended practice. Correlate results with reference intervals established for methodology used. Point of care and core laboratory   troponin results are not interchangeable.         Sodium     135           Troponin I 42.348                                     Imaging Results          CT Cervical Spine Without Contrast (Preliminary result)  Result time 05/08/22 02:13:38    Preliminary result by Arnav Hernandez MD (05/08/22 02:13:38)                 Narrative:    START OF REPORT:  Technique: CT of the cervical spine was performed without intravenous contrast with axial as well as sagittal and coronal images.    Comparison: None.    Dosage Information: Automated exposure control was utilized.    Clinical history: Ams, post arrest.    Findings:  Lung apices: Groundglass opacity with interlobular septal thickening is seen involving bilateral apices.  Spine:  Mineralization: Within normal limits for age.  Rotation: No significant rotation is seen.  Scoliosis: No significant scoliosis is seen.  Vertebral Fusion: No vertebral fusion is identified.  Listhesis: No significant listhesis is identified.  Lordosis: Degenerative straightening of the cervical lordosis is seen.  Intervertebral disc spaces: The intervertebral discs are preserved throughout.  Osteophytes: Multilevel endplate osteophytes are seen.  Endplate Sclerosis: No significant endplate sclerosis is seen.  Uncovertebral degenerative changes: Multilevel uncovertebral joint arthrosis is seen.  Facet degenerative changes: Multilevel facet degenerative changes are seen.  Calcifications: None.  Fractures: No acute fracture dislocation or subluxation is seen.  Orthopedic Hardware: None.    Miscellaneous: Endotracheal tube is seen. Nasogastric tube is seen.      Impression:  1. Groundglass opacity with interlobular septal thickening is seen involving bilateral apices. This could reflect pulmonary edema. Correlate clinically as regards additional evaluation and follow up.  2. Degenerative changes and other details as above. No fracture dislocation or subluxation is seen. Details as noted above.                                 CT Head Without Contrast (Preliminary result)  Result time 05/08/22 02:10:16     Preliminary result by Arnav Hernandez MD (05/08/22 02:10:16)                 Narrative:    START OF REPORT:  Technique: CT of the head was performed without intravenous contrast with axial as well as coronal and sagittal images.    Comparison: None.    Dosage Information: Automated exposure control was utilized.    Clinical history: Ams, post arrest.    Findings:  CSF spaces: The ventricles, sulci and basal cisterns all appear mildly prominent global cerebral atrophy.  Brain parenchyma: Unremarkable with preservation of the grey white junction throughout. No acute infarct is identified.  Cerebellum: Unremarkable.  Vascular: Unremarkable venous sinuses.  Sella and skull base: The sella appears to be within normal limits for age.  Cerebellopontine angles: Within normal limits.  Herniation: None.  Intracranial calcifications: Incidental note is made of bilateral choroid plexus calcification. Incidental note is made of some pineal region calcification. Incidental note is made of minimal right basal ganglia calcifcation. There are a few scattered punctate intraparenchymal calcifications consistent with calcified granulomas or other dystrophic calcifications.  Calvarium: No acute linear or depressed skull fracture is seen.    Maxillofacial Structures:  Paranasal sinuses: There is some opacity and air fluid levels in the bilateral maxillary sinuses and bilateral sphenoid sinuses. The rest of the paranasal sinuses appear clear.  Orbits: The orbits appear unremarkable.  Zygomatic arches: The zygomatic arches are intact and unremarkable.  Temporal bones and mastoids: The temporal bones and mastoids appear unremarkable.  TMJ: The mandibular condyles appear normally placed with respect to the mandibular fossa.  Nasal Bones: The nasal septum is midline.      Impression:  1. There is some opacity and air fluid levels in the bilateral maxillary sinuses and bilateral sphenoid sinuses. This may reflect acute sinusitis. Correlate  clinically. The rest of the paranasal sinuses appear clear.  2. No acute intracranial process identified. Details and other findings as noted above.                                 CTA Chest Non-Coronary (PE Study) (Preliminary result)  Result time 05/08/22 02:07:03    Preliminary result by Arnav Hernandez MD (05/08/22 02:07:03)                 Narrative:    START OF REPORT:  Technique: CT Scan of the chest was performed with intravenous contrast with direct axial images as well as sagittal and coronal reconstruction images pulmonary embolus protocol.    Dosage Information: Automated Exposure Control was utilized.    Comparison: None.    Clinical History: Ams, sob, r/o pe; post arrest.    Findings:  Soft Tissues: Unremarkable.  Lines and Tubes: Endotracheal tube and nasogastric tube are seen in adequate position.  Axilla: A few prominent lymph nodes are seen in the axilla.  Neck: The visualized soft tissues of the neck appear unremarkable. The thyroid gland appear unremarkable.  Mediastinum: The mediastinal structures are within normal limits.  Heart: Mild cardiomegaly is seen.  Aorta: Unremarkable appearing aorta.  Pulmonary Arteries: No filling defects are seen in the pulmonary arteries to suggest pulmonary embolus. There is pronounced reflux of contrast into the IVC. With no contrast seen in the left ventricle at the time of the imaging. This suggests poor cardiac function. Correlate clinically.  Lungs: There is extensive diffuse bilateral ground glass opacity with large areas ofconsolidation with some mild dependent predominance. Interlobular septal thickening with groundglass is seen involving the upper lobes.  Pleura: No effusions or pneumothorax are identified.  Bony Structures: The visualized bony structures appear unremarkable.  Spine: The visualized dorsal spine appears unremarkable.  Ribs: No rib fractures are identified. The ribs appear unremarkable. There appear to be fractures of the manubrium andbody  of the sternum seen on image 63 series number 6.  Abdomen: Unremarkable.      Impression:  1. There is extensive diffuse bilateral ground glass opacity with large areas ofconsolidation with some mild dependent predominance. Interlobular septal thickening with groundglass is seen involving the upper lobes. These findings are consistent with pulmonary edema with possible elements of aspiration not excluded. Correlate with clinical and laboratory findings as regards additional evaluation and follow up.  2. There is pronounced reflux of contrast into the IVC. With no contrast seen in the left ventricle at the time of the imaging. This suggests poor cardiac function. Correlate clinically.  3. No CT evidence of pulmonary embolism. Details and other findings as discussed above.                                 X-Ray Chest AP Portable (In process)                 Last Lipids:  No results found for: CHOL  No results found for: HDL  No results found for: LDLCALC  No results found for: TRIG  No results found for: CHOLHDL    EKG:    Results for orders placed or performed during the hospital encounter of 05/08/22   EKG 12-lead    Narrative    Test Reason : I21.4,    Vent. Rate : 062 BPM     Atrial Rate : 000 BPM     P-R Int : 000 ms          QRS Dur : 124 ms      QT Int : 474 ms       P-R-T Axes : 000 -74 084 degrees     QTc Int : 481 ms    Wide QRS rhythm with occasional Premature ventricular complexes  Left axis deviation  Right bundle branch block  LVH with repolarization abnormality ( R in aVL )  Abnormal ECG  When compared with ECG of 08-MAY-2022 01:09,  Wide QRS rhythm has replaced Sinus rhythm  Vent. rate has decreased BY  57 BPM    Referred By: [email or web address]   SELF           Confirmed By:        Telemetry: Bradycardia 40's    Physical Exam:  Physical Exam  Constitutional:       Appearance: Normal appearance.   Cardiovascular:      Rate and Rhythm: Regular rhythm. Bradycardia present.      Pulses: Normal pulses.      Heart  sounds: Normal heart sounds.   Pulmonary:      Breath sounds: Rales present.      Comments: Intubated/Mechanical Ventilation  Skin:     General: Skin is dry.       Home Medications:   No current facility-administered medications on file prior to encounter.     No current outpatient medications on file prior to encounter.       Current Inpatient Medications:    Current Facility-Administered Medications:     dextrose 10 % infusion, , Intravenous, PRN, Kandy Eden MD    dextrose 10 % infusion, , Intravenous, PRN, Kandy Eden MD    dextrose 10% bolus 125 mL, 12.5 g, Intravenous, PRN, Kandy Eden MD    dextrose 10% bolus 250 mL, 25 g, Intravenous, PRN, Kandy Eden MD    doxycycline (VIBRAMYCIN) 100 mg in dextrose 5 % 250 mL IVPB, 100 mg, Intravenous, Q12H, Kandy Eden MD, Last Rate: 250 mL/hr at 05/08/22 0553, 100 mg at 05/08/22 0553    enoxaparin injection 40 mg, 40 mg, Subcutaneous, Daily, Kandy Eden MD    EPINEPHrine (ADRENALIN) 1 mg/mL (1 mL) injection, , , ,     EPINEPHrine (ADRENALIN) 5 mg in dextrose 5 % 250 mL infusion, 0-2 mcg/kg/min, Intravenous, Continuous, Kandy Eden MD    hydrALAZINE injection 10 mg, 10 mg, Intravenous, Q4H PRN, Kandy Eden MD    insulin regular bolus from bag/infusion 10 Units, 0.1 Units/kg, Intravenous, Once, Kandy Eden MD    insulin regular in 0.9 % NaCl 100 unit/100 mL (1 unit/mL) infusion, 2 Units/hr, Intravenous, Continuous, Kandy Eden MD    levETIRAcetam (KEPPRA) 500 mg in sodium chloride 0.9 % 100 mL IVPB (MB+), 500 mg, Intravenous, Q12H, Kandy Eden MD    lorazepam injection 2 mg, 2 mg, Intravenous, Q10 Min PRN, Kandy Eden MD    magnesium sulfate 2g in water 50mL IVPB (premix), 2 g, Intravenous, PRN, Kandy Eden MD, Last Rate: 25 mL/hr at 05/08/22 0540, 2 g at 05/08/22 0540    melatonin tablet 6 mg, 6 mg, Oral, Nightly PRN, Kandy Eden MD     NORepinephrine bitartrate-NaCl 8 mg/250 mL (32 mcg/mL) infusion, 0.05 mcg/kg/min, Intravenous, Continuous, Pankaj Parsons MD, Last Rate: 18.8 mL/hr at 05/08/22 0245, 0.1 mcg/kg/min at 05/08/22 0245    piperacillin-tazobactam (ZOSYN) 4.5 g in sodium chloride 0.9 % 100 mL IVPB (MB+), 4.5 g, Intravenous, Q8H, Kandy Eden MD    propofol (DIPRIVAN) 10 mg/mL infusion, 0-50 mcg/kg/min, Intravenous, Continuous, Kandy Eden MD    sodium bicarbonate 150 mEq in dextrose 5 % 1,000 mL infusion, , Intravenous, Continuous, Kandy Eden MD    sodium chloride 0.9% bolus 1,000 mL, 1,000 mL, Intravenous, Once, Kandy Eden MD    sodium chloride 0.9% flush 10 mL, 10 mL, Intravenous, PRN, Kandy Eden MD    Pharmacy to dose Vancomycin consult, , , Once **AND** vancomycin - pharmacy to dose, , Intravenous, pharmacy to manage frequency, Kandy Eden MD    vancomycin 1.5 g in dextrose 5 % 250 mL IVPB (ready to mix), 1,500 mg, Intravenous, Q24H, Pankaj Parsons MD    VTE Risk Mitigation (From admission, onward)         Ordered     enoxaparin injection 40 mg  Daily         05/08/22 0418     IP VTE HIGH RISK PATIENT  Once         05/08/22 0418     Place sequential compression device  Until discontinued         05/08/22 0418                Assessment:   Cardiopulmonary Arrest  NSTEMI (Unspecified)  Acute Hypoxemic Respiratory Failure Requiring Intubation/Ventialtion  Acute Heart Failure (Unspecified Awaiting Echo)    - Diffuse Bilateral Ground Glass Opacities on CXR    - No CT Evidence of PE  Bradycardia with AV Dissociation   Hypotension Requiring Vasopressor Support    - History of HTN  DM II  Renal Insufficiency  Possible Sepsis    - On Antibiotics    - Blood Cultures Pending  No known History of GI Bleed    PLAN:   Check Echocardiogram  Hypothermia Protocol in Progress  ASA/Plavix Loads Given (5.8.22)  On Heparin gtt  Continue to Hold All SA/AV Jd Blocking Agents  Further Cardiac  Management per Patient's Primary Cardiologist, Dr. Desouza. [facility] will sign off.    Thank you for your consult.     CLAUDETTE Booker  Cardiology  [facility] - [facility]  05/08/2022 6:51 AM

## 2022-05-08 NOTE — ED PROVIDER NOTES
Encounter Date: 5/8/2022       History     Chief Complaint   Patient presents with    Respiratory Arrest     Unresponsive. BVM on arrival by EMS. No BP detected     Patient is a 72-year-old female brought in by ambulance secondary to respiratory arrest.  I spoke with patient's  who states patient was not feeling well and was having shortness of breat patient.   states he is going to take his wife to hospital when she collapsed.  He called EMS.  EMS reports the patient had agonal respirations on upon arrival and her sat was in the low 50s.  She had agonal respirations.  They state that she had a palpable pulse on arrival.  Patient arrives to the ER with a pulse with assisted ventilations being given.        Review of patient's allergies indicates:  No Known Allergies  No past medical history on file.  No past surgical history on file.  No family history on file.     Review of Systems   Unable to perform ROS: Acuity of condition       Physical Exam     Initial Vitals   BP Pulse Resp Temp SpO2   05/08/22 0101 05/08/22 0058 05/08/22 0101 05/08/22 0204 05/08/22 0058   (!) 137/90 (!) 27 (!) 50 98.4 °F (36.9 °C) (!) 55 %      MAP       --                Physical Exam    HENT:   Head: Normocephalic and atraumatic.   Eyes:   Pupils 4 mm bilaterally on arrival and unreactive   Cardiovascular:   Patient pulseless upon arrival.  CPR was initiated.   Pulmonary/Chest:   Assisted ventilations being given on arrival, patient was intubated shortly after arrival.  Patient had mod amount of vomitus within the oropharynx prior to intubation.  No spontaneous respirations.   Abdominal: Abdomen is soft.     Neurological:   Patient is unresponsive to verbal or painful stimulus upon arrival         ED Course   Critical Care    Date/Time: 5/8/2022 3:59 AM  Performed by: Pankaj Parsons MD  Authorized by: Pankaj Parsons MD   Direct patient critical care time: 75 minutes  Total critical care time (exclusive of procedural  time) : 75 minutes  Critical care was necessary to treat or prevent imminent or life-threatening deterioration of the following conditions: circulatory failure and respiratory failure.        Labs Reviewed   B-TYPE NATRIURETIC PEPTIDE - Abnormal; Notable for the following components:       Result Value    Natriuretic Peptide 333.5 (*)     All other components within normal limits   COMPREHENSIVE METABOLIC PANEL - Abnormal; Notable for the following components:    Carbon Dioxide 14 (*)     Glucose Level 554 (*)     Creatinine 1.52 (*)     Protein Total 8.2 (*)     Globulin 4.7 (*)     Albumin/Globulin Ratio 0.7 (*)     Alkaline Phosphatase 157 (*)     All other components within normal limits   TROPONIN I - Abnormal; Notable for the following components:    Troponin-I 0.136 (*)     All other components within normal limits   LACTIC ACID, PLASMA - Abnormal; Notable for the following components:    Lactic Acid Level 16.9 (*)     All other components within normal limits   CBC WITH DIFFERENTIAL - Abnormal; Notable for the following components:    Hct 50.1 (*)     MCV 94.7 (*)     MCHC 30.3 (*)     MPV 13.8 (*)     Abs Lymph 4.99 (*)     IG# 0.04 (*)     All other components within normal limits   TROPONIN ISTAT - Abnormal; Notable for the following components:    POC Cardiac Troponin I 0.87 (*)     All other components within normal limits   PROTIME-INR - Normal   APTT - Normal   BLOOD CULTURE [facility]   BLOOD CULTURE [facility]   RESPIRATORY CULTURE ([facility])   CBC W/ AUTO DIFFERENTIAL    Narrative:     The following orders were created for panel order CBC auto differential.  Procedure                               Abnormality         Status                     ---------                               -----------         ------                     CBC with Differential[[id number]]        Abnormal            Final result               Manual Differential[[id number]]                              In process                   Please view  results for these tests on the individual orders.   MANUAL DIFFERENTIAL   SARS-COV-2 (COVID-19) QUALITATIVE PCR   LACTIC ACID, PLASMA   ISTAT CHEM8     EKG Readings: (Independently Interpreted)   Rhythm: Sinus Tachycardia. Heart Rate: 119. Ectopy: No Ectopy. Conduction: LBBB. Axis: Left Axis Deviation.   I researched [employer] for a previous EKG, I found 1 going back to 2017. On this EKG, patient has no left bundle branch block      ECG Results          EKG 12-lead (In process)  Result time 05/08/22 03:53:20    In process by Interface, Lab In [facility] (05/08/22 03:53:20)                 Narrative:    Test Reason : R06.02,    Vent. Rate : 119 BPM     Atrial Rate : 119 BPM     P-R Int : 128 ms          QRS Dur : 164 ms      QT Int : 354 ms       P-R-T Axes : 000 009 193 degrees     QTc Int : 497 ms    Sinus tachycardia  Left bundle branch block  Abnormal ECG  No previous ECGs available    Referred By: [email or web address]   SELF           Confirmed By:                             Imaging Results          US Abdomen Limited (In process)                CT Cervical Spine Without Contrast (Preliminary result)  Result time 05/08/22 02:13:38    Preliminary result by Arnav Hernandez MD (05/08/22 02:13:38)                 Narrative:    START OF REPORT:  Technique: CT of the cervical spine was performed without intravenous contrast with axial as well as sagittal and coronal images.    Comparison: None.    Dosage Information: Automated exposure control was utilized.    Clinical history: Ams, post arrest.    Findings:  Lung apices: Groundglass opacity with interlobular septal thickening is seen involving bilateral apices.  Spine:  Mineralization: Within normal limits for age.  Rotation: No significant rotation is seen.  Scoliosis: No significant scoliosis is seen.  Vertebral Fusion: No vertebral fusion is identified.  Listhesis: No significant listhesis is identified.  Lordosis: Degenerative straightening of the cervical lordosis is  seen.  Intervertebral disc spaces: The intervertebral discs are preserved throughout.  Osteophytes: Multilevel endplate osteophytes are seen.  Endplate Sclerosis: No significant endplate sclerosis is seen.  Uncovertebral degenerative changes: Multilevel uncovertebral joint arthrosis is seen.  Facet degenerative changes: Multilevel facet degenerative changes are seen.  Calcifications: None.  Fractures: No acute fracture dislocation or subluxation is seen.  Orthopedic Hardware: None.    Miscellaneous: Endotracheal tube is seen. Nasogastric tube is seen.      Impression:  1. Groundglass opacity with interlobular septal thickening is seen involving bilateral apices. This could reflect pulmonary edema. Correlate clinically as regards additional evaluation and follow up.  2. Degenerative changes and other details as above. No fracture dislocation or subluxation is seen. Details as noted above.                                 CT Head Without Contrast (Preliminary result)  Result time 05/08/22 02:10:16    Preliminary result by Arnav Hernandez MD (05/08/22 02:10:16)                 Narrative:    START OF REPORT:  Technique: CT of the head was performed without intravenous contrast with axial as well as coronal and sagittal images.    Comparison: None.    Dosage Information: Automated exposure control was utilized.    Clinical history: Ams, post arrest.    Findings:  CSF spaces: The ventricles, sulci and basal cisterns all appear mildly prominent global cerebral atrophy.  Brain parenchyma: Unremarkable with preservation of the grey white junction throughout. No acute infarct is identified.  Cerebellum: Unremarkable.  Vascular: Unremarkable venous sinuses.  Sella and skull base: The sella appears to be within normal limits for age.  Cerebellopontine angles: Within normal limits.  Herniation: None.  Intracranial calcifications: Incidental note is made of bilateral choroid plexus calcification. Incidental note is made of some  pineal region calcification. Incidental note is made of minimal right basal ganglia calcifcation. There are a few scattered punctate intraparenchymal calcifications consistent with calcified granulomas or other dystrophic calcifications.  Calvarium: No acute linear or depressed skull fracture is seen.    Maxillofacial Structures:  Paranasal sinuses: There is some opacity and air fluid levels in the bilateral maxillary sinuses and bilateral sphenoid sinuses. The rest of the paranasal sinuses appear clear.  Orbits: The orbits appear unremarkable.  Zygomatic arches: The zygomatic arches are intact and unremarkable.  Temporal bones and mastoids: The temporal bones and mastoids appear unremarkable.  TMJ: The mandibular condyles appear normally placed with respect to the mandibular fossa.  Nasal Bones: The nasal septum is midline.      Impression:  1. There is some opacity and air fluid levels in the bilateral maxillary sinuses and bilateral sphenoid sinuses. This may reflect acute sinusitis. Correlate clinically. The rest of the paranasal sinuses appear clear.  2. No acute intracranial process identified. Details and other findings as noted above.                                 CTA Chest Non-Coronary (PE Study) (Preliminary result)  Result time 05/08/22 02:07:03    Preliminary result by Arnav Hernandez MD (05/08/22 02:07:03)                 Narrative:    START OF REPORT:  Technique: CT Scan of the chest was performed with intravenous contrast with direct axial images as well as sagittal and coronal reconstruction images pulmonary embolus protocol.    Dosage Information: Automated Exposure Control was utilized.    Comparison: None.    Clinical History: Ams, sob, r/o pe; post arrest.    Findings:  Soft Tissues: Unremarkable.  Lines and Tubes: Endotracheal tube and nasogastric tube are seen in adequate position.  Axilla: A few prominent lymph nodes are seen in the axilla.  Neck: The visualized soft tissues of the neck  appear unremarkable. The thyroid gland appear unremarkable.  Mediastinum: The mediastinal structures are within normal limits.  Heart: Mild cardiomegaly is seen.  Aorta: Unremarkable appearing aorta.  Pulmonary Arteries: No filling defects are seen in the pulmonary arteries to suggest pulmonary embolus. There is pronounced reflux of contrast into the IVC. With no contrast seen in the left ventricle at the time of the imaging. This suggests poor cardiac function. Correlate clinically.  Lungs: There is extensive diffuse bilateral ground glass opacity with large areas ofconsolidation with some mild dependent predominance. Interlobular septal thickening with groundglass is seen involving the upper lobes.  Pleura: No effusions or pneumothorax are identified.  Bony Structures: The visualized bony structures appear unremarkable.  Spine: The visualized dorsal spine appears unremarkable.  Ribs: No rib fractures are identified. The ribs appear unremarkable. There appear to be fractures of the manubrium andbody of the sternum seen on image 63 series number 6.  Abdomen: Unremarkable.      Impression:  1. There is extensive diffuse bilateral ground glass opacity with large areas ofconsolidation with some mild dependent predominance. Interlobular septal thickening with groundglass is seen involving the upper lobes. These findings are consistent with pulmonary edema with possible elements of aspiration not excluded. Correlate with clinical and laboratory findings as regards additional evaluation and follow up.  2. There is pronounced reflux of contrast into the IVC. With no contrast seen in the left ventricle at the time of the imaging. This suggests poor cardiac function. Correlate clinically.  3. No CT evidence of pulmonary embolism. Details and other findings as discussed above.                                 X-Ray Chest AP Portable (In process)                  Medications   NORepinephrine bitartrate-NaCl 8 mg/250 mL (32  mcg/mL) infusion (0.1 mcg/kg/min × 100 kg Intravenous Rate/Dose Change 5/8/22 0245)   sodium bicarbonate 8.4 % (1 mEq/mL) injection 50 mEq (has no administration in time range)   sodium bicarbonate 8.4 % (1 mEq/mL) injection 50 mEq (has no administration in time range)   sodium bicarbonate 150 mEq in dextrose 5 % 1,000 mL infusion (has no administration in time range)   vancomycin - pharmacy to dose (has no administration in time range)   piperacillin-tazobactam (ZOSYN) 4.5 g in sodium chloride 0.9 % 100 mL IVPB (MB+) (has no administration in time range)   vancomycin 1.5 g in dextrose 5 % 250 mL IVPB (ready to mix) (has no administration in time range)   EPINEPHrine 0.1 mg/mL injection (0.1 mg Intravenous Given 5/8/22 0106)   sodium bicarbonate 8.4 % (1 mEq/mL) injection (50 mEq Intravenous Given 5/8/22 0102)   NORepinephrine 1 mg/mL injection (0.2 mcg/kg/min  Given 5/8/22 0125)   EPINEPHrine 0.1 mg/mL injection (0.1 mg Intravenous Given 5/8/22 0131)   iopamidoL (ISOVUE-370) injection 100 mL (100 mLs Intravenous Given 5/8/22 0212)   sodium bicarbonate 8.4 % (1 mEq/mL) injection (50 mEq Intravenous Given 5/8/22 0203)                 ED Course as of 05/08/22 0358   Sun May 08, 2022   0228 I spoke with patient's  and informed him that patient prognosis is poor.  Currently patient has normal vital signs, patient is normotensive and has a 97% sat on ventilator.  CT scan shows diffuse infiltrates.  I have no formal reading but does not appear to be any PE present on CT. [KG]   0229 I spoke with GLORIA Morales on-call for [facility].  Took my concern that patient has a left bundle-branch block but most likely patient's main problem is pulmonary and her acidosis.  The last CT scan I see is from 2017 in the [facility] system patient had no left bundle branch block. [KG]   0230 I spoke with intensivist on-call, she will come to examine patient. [KG]   0244 Patient does for his required CPR on 3 separate times thus far in the ER.   Currently she is on Levophed and has strong palpable pulses.  O2 sat is 97% on vent. [KG]   0245 I spoke with Dr. Koehler, he does not recommend intervention at this timeconsidering [KG]      ED Course User Index  [KG] Pankaj Parsons MD             Clinical Impression:   Final diagnoses:  [R06.02] SOB (shortness of breath)  [I46.9] Cardiopulmonary arrest (Primary)  [E87.2] Acidosis  [J69.0] Aspiration pneumonitis  [R41.89] Unresponsiveness          ED Disposition Condition    Admit               Pankaj Parsons MD  05/11/22 0018

## 2022-05-08 NOTE — PROCEDURES
"Millicent Arambula is a 72 y.o. female patient.    Temp: 96.1 °F (35.6 °C) (05/08/22 0800)  Pulse: (!) 53 (05/08/22 0700)  Resp: (!) 31 (05/08/22 0800)  BP: (!) 133/59 (05/08/22 0700)  SpO2: (!) 93 % (05/08/22 0700)  Weight: 100 kg (220 lb 7.4 oz) (05/08/22 0411)  Height: 5' 5" (165.1 cm) (05/08/22 0411)    PICC  Performed by: Purnima Reyes RN  Consent Done: Yes  Time out: Immediately prior to procedure a time out was called to verify the correct patient, procedure, equipment, support staff and site/side marked as required  Indications: med administration and hemodynamic monitoring  Local anesthetic: lidocaine 1% without epinephrine    Preparation: skin prepped with ChloraPrep  Skin prep agent dried: skin prep agent completely dried prior to procedure  Sterile barriers: all five maximum sterile barriers used - cap, mask, sterile gown, sterile gloves, and large sterile sheet  Hand hygiene: hand hygiene performed prior to central venous catheter insertion  Location details: left basilic  Catheter type: triple lumen  Catheter size: 5 Fr  Catheter Length: 40cm    Ultrasound guidance: yes  Vessel Caliber: large, compressibility normal  Number of attempts: 1  Post-procedure: blood return through all ports, chlorhexidine patch and sterile dressing applied  Estimated blood loss (mL): 0    Assessment: placement verified by x-ray          Name ***  5/8/2022  "

## 2022-05-08 NOTE — H&P
ICU History & Physical Note     Resident Team: Kandy Eden M.D.  Attending Physician: DERRICK Andrea MD      Date of Admit: 5/8/2022    Chief Complaint     Respiratory Arrest (Unresponsive. BVM on arrival by EMS. No BP detected)      Subjective:      History of Present Illness:  Millicent Arambula is a 72 y.o.   female who with a history of diabetes and hypertension reportedly is mildly uncomfortable  earlier to night before falling asleep, 1 hour after falling asleep she woke up with acute onset SOB.  By the time she walked to the car to come to hospital she passed out, which prompted her  to call EMS.  Upon EMS arrival patient was in agonal breathing, bradycardic.  Upon arrival of the patient to our ER, there is no pulse, patient was in asystole/pea.  Code blue was called in, patient was coded for about 15 minute and was emergently intubated, for airway protection achieved ROSC. Per ED doc noted to have significant vomitus during intubation concerning for aspiration.   Upon stabilizing she was seen in CT scan where she coded again.  Labs on admission significant for blood sugars in 500s, acidosis with bicarb of 14, mildly elevated troponin of 0.87, , EKG Wide QRS with left axis deviation and RBBB, cards consulted by Er Doc no acute interventions recommended.  CT head negative for any stroke, CT PE with no evidence of PE but diffuse infiltrates bilaterally from apex to bases, also concern for extensive pulmonary vascular congestion..  Patient was further admitted to ICU mechanically ventilated status post cardiac arrest.     Past Medical History:  DM, HTN    Past Surgical History:  Unable to obtain     Family History:  Unable to obtain.    Social History:    Unable to obtain    Allergies:  Review of patient's allergies indicates:   Allergen Reactions    Adhesive tape-silicones      Other reaction(s): SKIN COMES OFF       Home Medications:  Prior to Admission medications    Not on File  "        Review of Systems: unable to obtain 2/2 to patient's current clinical condition.         Objective:   Last 24 Hour Vital Signs:  BP  Min: 48/39  Max: 238/140  Temp  Av.8 °F (37.1 °C)  Min: 98.2 °F (36.8 °C)  Max: 100.4 °F (38 °C)  Pulse  Av.9  Min: 27  Max: 149  Resp  Av.3  Min: 14  Max: 71  SpO2  Av.3 %  Min: 50 %  Max: 100 %  Height  Av' 5.5" (166.4 cm)  Min: 5' 5" (165.1 cm)  Max: 5' 6" (167.6 cm)  Weight  Av kg (220 lb 7.4 oz)  Min: 100 kg (220 lb 7.4 oz)  Max: 100 kg (220 lb 7.4 oz)  Body mass index is 36.69 kg/m².  No intake/output data recorded.    Physical Examination:  Gen: NAD, mech ventilated  HEENT:  Pupils fixed and dilated. atraumatic, no scleral icterus, twitching movements of face and neck.  No cough or gag reflex.  Neck: supple  Heart: RRR. No M/R/G.   Lungs: Coarse  Breath sounds b/l.   Abd: soft, ND, NT. +BS  Extremities: No LE edema. Good pulses in all extremities.   MSK: No obvious deformities   Neuro:Unable to assess GCS-3  Skin: No rash or wounds          Laboratory:  Most Recent Data:  CBC:   Lab Results   Component Value Date    WBC 11.4 2022    HGB 15.2 2022    HCT 50.1 (H) 2022    MCV 94.7 (H) 2022    RDW 12.8 2022     WBC Differential:   Recent Labs   Lab 22  0109   WBC 11.4   HGB 15.2   HCT 50.1*   MCV 94.7*     BMP:   Lab Results   Component Value Date     (A) 2022    K 3.6 2022    CO2 14 (L) 2022    BUN 17.0 2022    CREATININE 1.52 (H) 2022    CALCIUM 10.0 2022     LFTs:   Lab Results   Component Value Date    ALBUMIN 3.5 2022    AST 21 2022    ALKPHOS 157 (H) 2022    ALT 16 2022     Coags:   Lab Results   Component Value Date    INR 1.06 2022     FLP: No results found for: CHOL, HDL, LDLCALC, TRIG, CHOLHDL  DM:   Lab Results   Component Value Date    CREATININE 1.52 (H) 2022     Thyroid: No results found for: TSH, FREET4, S6YDSIL, " M1EWWAX, THYROIDAB  Anemia: No results found for: IRON, TIBC, FERRITIN, LQLWOGIP00, FOLATE  Cardiac:   Lab Results   Component Value Date    TROPONINI 0.136 (H) 05/08/2022    .5 (H) 05/08/2022     Urinalysis: No results found for: LABURIN, COLORU, PHUA, CLARITYU, SPECGRAV, LABSPEC, NITRITE, PROTEINUR, GLUCOSEU, KETONESU, UROBILINOGEN, BILIRUBINUR, BLOODU, RBCU, WBCUA    Trended Lab Data:  Recent Labs   Lab 05/08/22  0109 05/08/22  0130 05/08/22  0455   WBC 11.4  --   --    HGB 15.2  --   --    HCT 50.1*  --   --    MCV 94.7*  --   --    RDW 12.8  --   --    NA  --  136* 135*   K  --  3.2* 3.6   CO2 14*  --   --    BUN 17.0  --   --    CREATININE 1.52*  --   --    ALBUMIN 3.5  --   --    AST 21  --   --    ALKPHOS 157*  --   --    ALT 16  --   --        Trended Cardiac Data:  Recent Labs   Lab 05/08/22 0109   TROPONINI 0.136*   .5*       Microbiology Data:  BCX, resp Cx,  resp PCR panel pending    Other Results:  EKG (my interpretation):     Radiology:  CTA Chest Non-Coronary (PE Study)  START OF REPORT:  Technique: CT Scan of the chest was performed with intravenous contrast with direct axial images as well as sagittal and coronal reconstruction images pulmonary embolus protocol.    Dosage Information: Automated Exposure Control was utilized.    Comparison: None.    Clinical History: Ams, sob, r/o pe; post arrest.    Findings:  Soft Tissues: Unremarkable.  Lines and Tubes: Endotracheal tube and nasogastric tube are seen in adequate position.  Axilla: A few prominent lymph nodes are seen in the axilla.  Neck: The visualized soft tissues of the neck appear unremarkable. The thyroid gland appear unremarkable.  Mediastinum: The mediastinal structures are within normal limits.  Heart: Mild cardiomegaly is seen.  Aorta: Unremarkable appearing aorta.  Pulmonary Arteries: No filling defects are seen in the pulmonary arteries to suggest pulmonary embolus. There is pronounced reflux of contrast into the IVC.  With no contrast seen in the left ventricle at the time of the imaging. This suggests poor cardiac function. Correlate clinically.  Lungs: There is extensive diffuse bilateral ground glass opacity with large areas ofconsolidation with some mild dependent predominance. Interlobular septal thickening with groundglass is seen involving the upper lobes.  Pleura: No effusions or pneumothorax are identified.  Bony Structures: The visualized bony structures appear unremarkable.  Spine: The visualized dorsal spine appears unremarkable.  Ribs: No rib fractures are identified. The ribs appear unremarkable. There appear to be fractures of the manubrium andbody of the sternum seen on image 63 series number 6.  Abdomen: Unremarkable.    Impression:  1. There is extensive diffuse bilateral ground glass opacity with large areas ofconsolidation with some mild dependent predominance. Interlobular septal thickening with groundglass is seen involving the upper lobes. These findings are consistent with pulmonary edema with possible elements of aspiration not excluded. Correlate with clinical and laboratory findings as regards additional evaluation and follow up.  2. There is pronounced reflux of contrast into the IVC. With no contrast seen in the left ventricle at the time of the imaging. This suggests poor cardiac function. Correlate clinically.  3. No CT evidence of pulmonary embolism. Details and other findings as discussed above.  CT Cervical Spine Without Contrast  START OF REPORT:  Technique: CT of the cervical spine was performed without intravenous contrast with axial as well as sagittal and coronal images.    Comparison: None.    Dosage Information: Automated exposure control was utilized.    Clinical history: Ams, post arrest.    Findings:  Lung apices: Groundglass opacity with interlobular septal thickening is seen involving bilateral apices.  Spine:  Mineralization: Within normal limits for age.  Rotation: No significant  rotation is seen.  Scoliosis: No significant scoliosis is seen.  Vertebral Fusion: No vertebral fusion is identified.  Listhesis: No significant listhesis is identified.  Lordosis: Degenerative straightening of the cervical lordosis is seen.  Intervertebral disc spaces: The intervertebral discs are preserved throughout.  Osteophytes: Multilevel endplate osteophytes are seen.  Endplate Sclerosis: No significant endplate sclerosis is seen.  Uncovertebral degenerative changes: Multilevel uncovertebral joint arthrosis is seen.  Facet degenerative changes: Multilevel facet degenerative changes are seen.  Calcifications: None.  Fractures: No acute fracture dislocation or subluxation is seen.  Orthopedic Hardware: None.    Miscellaneous: Endotracheal tube is seen. Nasogastric tube is seen.    Impression:  1. Groundglass opacity with interlobular septal thickening is seen involving bilateral apices. This could reflect pulmonary edema. Correlate clinically as regards additional evaluation and follow up.  2. Degenerative changes and other details as above. No fracture dislocation or subluxation is seen. Details as noted above.  CT Head Without Contrast  START OF REPORT:  Technique: CT of the head was performed without intravenous contrast with axial as well as coronal and sagittal images.    Comparison: None.    Dosage Information: Automated exposure control was utilized.    Clinical history: Ams, post arrest.    Findings:  CSF spaces: The ventricles, sulci and basal cisterns all appear mildly prominent global cerebral atrophy.  Brain parenchyma: Unremarkable with preservation of the grey white junction throughout. No acute infarct is identified.  Cerebellum: Unremarkable.  Vascular: Unremarkable venous sinuses.  Sella and skull base: The sella appears to be within normal limits for age.  Cerebellopontine angles: Within normal limits.  Herniation: None.  Intracranial calcifications: Incidental note is made of bilateral  choroid plexus calcification. Incidental note is made of some pineal region calcification. Incidental note is made of minimal right basal ganglia calcifcation. There are a few scattered punctate intraparenchymal calcifications consistent with calcified granulomas or other dystrophic calcifications.  Calvarium: No acute linear or depressed skull fracture is seen.    Maxillofacial Structures:  Paranasal sinuses: There is some opacity and air fluid levels in the bilateral maxillary sinuses and bilateral sphenoid sinuses. The rest of the paranasal sinuses appear clear.  Orbits: The orbits appear unremarkable.  Zygomatic arches: The zygomatic arches are intact and unremarkable.  Temporal bones and mastoids: The temporal bones and mastoids appear unremarkable.  TMJ: The mandibular condyles appear normally placed with respect to the mandibular fossa.  Nasal Bones: The nasal septum is midline.    Impression:  1. There is some opacity and air fluid levels in the bilateral maxillary sinuses and bilateral sphenoid sinuses. This may reflect acute sinusitis. Correlate clinically. The rest of the paranasal sinuses appear clear.  2. No acute intracranial process identified. Details and other findings as noted above.          Assessment & Plan:   Cardiopulmonary arrest x 2 S/p [facility] ventilated and achieved ROSC  NSTEMI  Diffuse Pulm infiltrates concerning for aspiration/CAP.  DM, uncontrolled blood sugars.  HTN  [place].  Lactic acidosis 2/2 above.  Significant anoxic brain injury secondary to above.  Elevated alk-phos..    Pt status post cardiac arrest currently [facility] vented, on low dose levo to maintain MAP > 65. Started on hypothermia protocol.   Patient has significant twitching movements in her face and eyes concerning for significant anoxic brain injury.  On Ativan PRN and scheduled Keppra for seizure prophy  Concerns for massive MI causing her current situation(uptrending troponins, new EKG changes) patient loaded with aspirin,  Plavix, started on IV heparin drip.  Ordered echocardiogram will follow-up.  Cardiology is on board, will appreciate further recommendations.  Diffuse pulmonary infiltrate concerning for aspiration pneumonia/cap in combination with pulmonary vascular congestion secondary to underlying CHF.On Broad-spectrum antibiotics, will deescalate per cultures.  Significant lactic acidosis due to hypoperfusion.  Isolated elevated alk-phos, we will obtain ultrasound abdomen for further assessment.  History of diabetes mellitus, not insulin dependent however blood sugars on admission severely uncontrolled and high 500s, started on insulin drip.  I highly suspect the patient had significant anoxic brain injury secondary to her cardiopulmonary arrest.  Discussed in detail in regards to her current situation with the family, opted to be DNR.  To make further family discussions in regards to further goals of care including withdrawal of care    .  ICU staff attending addendum to follow         Kandy Eden MD   [facility]    Staff addendum  I saw and examined patient today.  I reviewed the EMR and agree with the findings as above.  EMS reported patient with agonal breathing and bradycardia upon their arrival.  Apparently on arrival to the emergency room patient had no pulse and was coded for about 15 minutes prior to return of spontaneous circulation.  Patient was sent to CT and apparently arrested again while there.  An ICU patient was noted to have.  Therapeutic hypothermia protocol was initiated.  Patient has markedly elevated serum troponin and bilateral infiltrates likely secondary to pulmonary edema.  Profound acidosis with elevated serum lactate likely secondary to hypoperfusion.  Patient without history of lung disease so this appears to be a cardiac event but cannot rule out aspiration.  Continue ICU monitoring and along with therapeutic hypothermia.  Long-term outlook appears poor.

## 2022-05-08 NOTE — ED NOTES
Pt arrived to rm 19 via EMS, pt unresponsive, gcs-3; pt mottled and cold upon initial assessment, code started, MD to bedside, see code sheet

## 2022-05-09 NOTE — PLAN OF CARE
Problem: Skin Injury Risk Increased  Goal: Skin Health and Integrity  Outcome: Ongoing, Progressing  Intervention: Optimize Skin Protection  Flowsheets (Taken 5/9/2022 0534)  Pressure Reduction Techniques:   heels elevated off bed   sit time limited to 1 hour   pressure points protected   weight shift assistance provided  Pressure Reduction Devices:   heel offloading device utilized   positioning supports utilized   pressure-redistributing mattress utilized  Skin Protection:   adhesive use limited   transparent dressing maintained  Head of Bed (HOB) Positioning: HOB at 30 degrees  Intervention: Promote and Optimize Oral Intake  Flowsheets (Taken 5/9/2022 0534)  Oral Nutrition Promotion: safe use of adaptive equipment encouraged     Problem: Skin Injury Risk Increased  Goal: Skin Health and Integrity  Intervention: Optimize Skin Protection  Flowsheets (Taken 5/9/2022 0534)  Pressure Reduction Techniques:   heels elevated off bed   sit time limited to 1 hour   pressure points protected   weight shift assistance provided  Pressure Reduction Devices:   heel offloading device utilized   positioning supports utilized   pressure-redistributing mattress utilized  Skin Protection:   adhesive use limited   transparent dressing maintained  Head of Bed (HOB) Positioning: HOB at 30 degrees     Problem: Skin Injury Risk Increased  Goal: Skin Health and Integrity  Intervention: Promote and Optimize Oral Intake  Flowsheets (Taken 5/9/2022 0534)  Oral Nutrition Promotion: safe use of adaptive equipment encouraged     Problem: Communication Impairment (Mechanical Ventilation, Invasive)  Goal: Effective Communication  Outcome: Ongoing, Progressing  Intervention: Ensure Effective Communication  Flowsheets (Taken 5/9/2022 0534)  Communication Enhancement Strategies: family involved in communication plan     Problem: Communication Impairment (Mechanical Ventilation, Invasive)  Goal: Effective Communication  Intervention: Ensure  Effective Communication  Flowsheets (Taken 5/9/2022 0534)  Communication Enhancement Strategies: family involved in communication plan

## 2022-05-09 NOTE — PROGRESS NOTES
Cardiology Daily Progress Note    Patient Name: Millicent Arambula  Age: 72 y.o.  : 1950  MRN: [id number]  Admission Date: 2022      Subjective: No acute cardiac events overnight. Patient is ST, RBBB. Patient is sedated and intubated. Remains on vasopressor support.  Nursing notes no gag, cough, or corneal reflexes. On heparin gtt, some blood noted in OGT. Patient in re-warming phase of therapeutic hypothermia protocol.        Review of Systems - General ROS:  Unable to assess given patient clinical condition.       Health Status:  Review of patient's allergies indicates:   Allergen Reactions    Adhesive tape-silicones      Other reaction(s): SKIN COMES OFF       Current Facility-Administered Medications   Medication Dose Route Frequency Provider Last Rate Last Admin    dextrose 10 % infusion   Intravenous PRN Kandy Eden MD        dextrose 10 % infusion   Intravenous PRN Kandy Eden MD        dextrose 10% bolus 125 mL  12.5 g Intravenous PRN Kandy Eden MD        dextrose 10% bolus 250 mL  25 g Intravenous PRN Kandy Eden MD        doxycycline (VIBRAMYCIN) 100 mg in dextrose 5 % 250 mL IVPB  100 mg Intravenous Q12H Kandy Eden MD   Stopped at 22 0621    EPINEPHrine (ADRENALIN) 5 mg in dextrose 5 % 250 mL infusion  0-2 mcg/kg/min Intravenous Continuous Kandy Eden MD        heparin 25,000 units in dextrose 5% (100 units/ml) IV bolus from bag - ADDITIONAL PRN BOLUS - 30 units/kg (max bolus 4000 units)  30 Units/kg (Adjusted) Intravenous PRN Kandy Eden MD        heparin 25,000 units in dextrose 5% (100 units/ml) IV bolus from bag - ADDITIONAL PRN BOLUS - 60 units/kg (max bolus 4000 units)  53.9 Units/kg (Adjusted) Intravenous PRN Kandy Eden MD        heparin 25,000 units in dextrose 5% (100 units/ml) IV bolus from bag INITIAL BOLUS (max bolus 4000 units)  53.9 Units/kg (Adjusted) Intravenous Once Kandy Eden MD         heparin 25,000 units in dextrose 5% 250 mL (100 units/mL) infusion LOW INTENSITY nomogram - LAF  0-40 Units/kg/hr (Adjusted) Intravenous Continuous Kandy Eden MD 5.9 mL/hr at 05/08/22 2215 8 Units/kg/hr at 05/08/22 2215    hydrALAZINE injection 10 mg  10 mg Intravenous Q4H PRN Kandy Eden MD        insulin regular bolus from bag/infusion 10 Units  0.1 Units/kg Intravenous Once Kandy Eden MD        insulin regular in 0.9 % NaCl 100 unit/100 mL (1 unit/mL) infusion  2 Units/hr Intravenous Continuous Kandy Eden MD 7 mL/hr at 05/09/22 0528 7 Units/hr at 05/09/22 0528    levETIRAcetam (KEPPRA) 500 mg in sodium chloride 0.9 % 100 mL IVPB (MB+)  500 mg Intravenous Q12H Kandy Eden MD   500 mg at 05/08/22 2014    lorazepam injection 2 mg  2 mg Intravenous Q10 Min PRN Kandy Eden MD        melatonin tablet 6 mg  6 mg Oral Nightly PRN Kandy Eden MD        NORepinephrine bitartrate-NaCl 8 mg/250 mL (32 mcg/mL) infusion  0.05 mcg/kg/min Intravenous Continuous Pankaj Parsons MD 37.5 mL/hr at 05/09/22 0517 0.2 mcg/kg/min at 05/09/22 0517    piperacillin-tazobactam (ZOSYN) 4.5 g in sodium chloride 0.9 % 100 mL IVPB (MB+)  4.5 g Intravenous Q8H Kandy Eden MD   Stopped at 05/09/22 0554    propofol (DIPRIVAN) 10 mg/mL infusion  0-50 mcg/kg/min Intravenous Continuous Kandy Eden MD 9 mL/hr at 05/09/22 0518 15 mcg/kg/min at 05/09/22 0518    sodium bicarbonate 100 mEq in sodium chloride 0.45% 1,000 mL infusion   Intravenous Continuous DERRICK Andrea MD 75 mL/hr at 05/08/22 1843 New Bag at 05/08/22 1843    sodium chloride 0.9% flush 10 mL  10 mL Intravenous PRN Kandy Eden MD        sodium chloride 0.9% flush 10 mL  10 mL Intravenous Q6H DERRICK Andrea MD   10 mL at 05/09/22 0539    And    sodium chloride 0.9% flush 10 mL  10 mL Intravenous PRN DERRICK Andrea MD        vancomycin - pharmacy to dose   Intravenous pharmacy to manage  frequency Kandy Eden MD        vancomycin 1.5 g in dextrose 5 % 250 mL IVPB (ready to mix)  1,500 mg Intravenous Q24H Pankaj Parsons MD   Stopped at 05/08/22 1134       Objective:  Patient Vitals for the past 24 hrs:   BP Temp Temp src Pulse Resp SpO2   05/09/22 0630 -- -- -- (!) 116 20 95 %   05/09/22 0615 -- -- -- (!) 116 (!) 26 (!) 94 %   05/09/22 0600 120/64 97.3 °F (36.3 °C) -- (!) 116 (!) 28 (!) 94 %   05/09/22 0544 -- 97.3 °F (36.3 °C) -- (!) 119 (!) 23 (!) 94 %   05/09/22 0530 -- -- -- (!) 119 (!) 27 (!) 92 %   05/09/22 0513 -- 97.3 °F (36.3 °C) Bladder (!) 118 14 (!) 92 %   05/09/22 0500 117/74 -- -- (!) 121 17 (!) 92 %   05/09/22 0445 -- -- -- (!) 119 15 (!) 92 %   05/09/22 0430 -- -- -- (!) 120 16 (!) 93 %   05/09/22 0418 118/70 -- -- -- -- --   05/09/22 0415 -- -- -- (!) 120 15 (!) 92 %   05/09/22 0408 -- 97.2 °F (36.2 °C) Bladder (!) 118 13 (!) 93 %   05/09/22 0400 118/70 97.3 °F (36.3 °C) Bladder (!) 120 (!) 26 (!) 93 %   05/09/22 0345 -- -- -- (!) 119 16 (!) 92 %   05/09/22 0330 -- -- -- (!) 120 19 (!) 92 %   05/09/22 0315 -- -- -- (!) 119 17 (!) 92 %   05/09/22 0300 126/77 -- Bladder (!) 119 (!) 34 (!) 92 %   05/09/22 0245 -- -- -- (!) 119 13 (!) 92 %   05/09/22 0243 -- 97.2 °F (36.2 °C) -- -- -- --   05/09/22 0229 -- -- -- (!) 119 14 (!) 92 %   05/09/22 0215 -- -- -- (!) 119 14 (!) 92 %   05/09/22 0200 109/69 96.8 °F (36 °C) Bladder (!) 119 (!) 34 (!) 92 %   05/09/22 0145 -- -- -- (!) 122 15 (!) 91 %   05/09/22 0130 -- -- -- (!) 123 13 (!) 91 %   05/09/22 0115 -- -- -- (!) 122 15 (!) 91 %   05/09/22 0101 131/72 -- -- -- -- --   05/09/22 0100 -- 96.8 °F (36 °C) -- (!) 122 -- (!) 91 %   05/09/22 0056 -- 96.8 °F (36 °C) Bladder -- (!) 34 --   05/09/22 0055 -- -- -- -- (!) 34 --   05/09/22 0045 -- -- -- (!) 121 12 (!) 92 %   05/09/22 0030 -- -- -- (!) 121 14 (!) 91 %   05/09/22 0015 -- -- -- (!) 121 13 (!) 90 %   05/09/22 0001 125/72 -- -- -- -- --   05/09/22 0000 -- -- Bladder (!) 122 (!)  34 (!) 91 %   05/08/22 2344 -- -- -- -- (!) 34 --   05/08/22 2343 -- 96.8 °F (36 °C) -- -- 15 (!) 91 %   05/08/22 2341 -- -- -- (!) 121 12 (!) 91 %   05/08/22 2330 -- -- -- (!) 122 12 (!) 90 %   05/08/22 2315 -- -- -- (!) 121 14 (!) 92 %   05/08/22 2301 133/75 -- -- -- -- --   05/08/22 2300 -- 97 °F (36.1 °C) Bladder (!) 121 (!) 34 (!) 91 %   05/08/22 2245 -- -- -- (!) 120 13 (!) 91 %   05/08/22 2230 -- -- -- (!) 120 13 (!) 90 %   05/08/22 2215 -- -- -- (!) 119 15 (!) 92 %   05/08/22 2201 (!) 145/71 -- -- -- -- --   05/08/22 2200 -- 97 °F (36.1 °C) Bladder (!) 119 (!) 34 (!) 91 %   05/08/22 2145 -- -- -- (!) 118 16 (!) 91 %   05/08/22 2130 -- -- -- (!) 117 17 (!) 91 %   05/08/22 2115 -- -- -- (!) 114 17 (!) 91 %   05/08/22 2102 130/68 -- -- -- -- --   05/08/22 2100 -- 97 °F (36.1 °C) Bladder (!) 114 (!) 34 (!) 91 %   05/08/22 2045 -- -- -- (!) 113 (!) 29 (!) 91 %   05/08/22 2030 -- -- -- (!) 113 (!) 27 (!) 91 %   05/08/22 2015 -- -- -- (!) 112 (!) 33 (!) 90 %   05/08/22 2000 (!) 143/63 97 °F (36.1 °C) Bladder (!) 112 (!) 29 (!) 91 %   05/08/22 1945 -- -- -- 110 (!) 32 (!) 91 %   05/08/22 1930 -- -- -- 109 (!) 33 (!) 91 %   05/08/22 1915 -- -- -- 107 (!) 34 (!) 92 %   05/08/22 1900 (!) 145/64 97 °F (36.1 °C) Bladder 103 (!) 32 (!) 94 %   05/08/22 1845 -- -- -- 105 (!) 27 (!) 93 %   05/08/22 1825 -- -- -- 106 (!) 28 (!) 92 %   05/08/22 1815 -- -- -- 108 (!) 33 (!) 93 %   05/08/22 1802 129/70 -- -- -- -- --   05/08/22 1800 129/70 96.6 °F (35.9 °C) Bladder 108 (!) 33 (!) 93 %   05/08/22 1745 -- -- -- 106 (!) 31 (!) 93 %   05/08/22 1721 -- -- -- 109 (!) 27 (!) 93 %   05/08/22 1715 -- -- -- 108 (!) 30 (!) 93 %   05/08/22 1702 (!) 145/62 -- -- -- -- --   05/08/22 1700 -- 96.6 °F (35.9 °C) Bladder -- (!) 32 --   05/08/22 1648 -- -- -- 102 (!) 25 95 %   05/08/22 1645 -- -- -- 106 (!) 26 95 %   05/08/22 1630 -- -- -- 110 (!) 25 (!) 92 %   05/08/22 1615 -- -- -- (!) 112 (!) 25 (!) 93 %   05/08/22 1602 (!) 132/110 -- -- -- --  --   05/08/22 1600 -- 97 °F (36.1 °C) Bladder -- -- --   05/08/22 1558 -- -- -- (!) 111 (!) 32 (!) 91 %   05/08/22 1545 -- -- -- 110 (!) 27 (!) 92 %   05/08/22 1530 -- -- -- 107 (!) 31 (!) 92 %   05/08/22 1515 -- -- -- 110 (!) 24 (!) 91 %   05/08/22 1502 (!) 145/83 -- -- -- -- --   05/08/22 1500 -- 97 °F (36.1 °C) Bladder (!) 112 (!) 25 (!) 90 %   05/08/22 1434 -- -- -- (!) 111 (!) 22 (!) 90 %   05/08/22 1430 -- -- -- (!) 112 (!) 25 (!) 90 %   05/08/22 1415 -- -- -- 110 (!) 23 (!) 90 %   05/08/22 1402 (!) 122/101 -- -- -- -- --   05/08/22 1400 -- 97 °F (36.1 °C) Bladder 105 (!) 23 (!) 91 %   05/08/22 1339 -- -- -- 98 (!) 27 (!) 93 %   05/08/22 1330 -- -- -- 100 (!) 21 (!) 91 %   05/08/22 1315 -- -- -- 98 (!) 27 (!) 91 %   05/08/22 1300 106/80 97 °F (36.1 °C) Bladder 99 (!) 27 (!) 91 %   05/08/22 1245 -- -- -- 98 (!) 26 (!) 91 %   05/08/22 1230 -- -- -- 98 (!) 25 (!) 90 %   05/08/22 1215 -- -- -- 97 (!) 25 (!) 91 %   05/08/22 1200 (!) 123/99 96.6 °F (35.9 °C) Bladder 98 (!) 30 (!) 91 %   05/08/22 1145 -- -- -- 94 (!) 30 (!) 92 %   05/08/22 1130 -- -- -- 92 (!) 25 (!) 93 %   05/08/22 1116 -- -- -- 89 (!) 24 97 %   05/08/22 1115 -- -- -- 88 (!) 27 98 %   05/08/22 1114 (!) 140/76 -- -- -- -- --   05/08/22 1100 (!) 140/76 96.1 °F (35.6 °C) Bladder 99 (!) 28 (!) 89 %   05/08/22 1045 -- -- -- 98 (!) 25 (!) 90 %   05/08/22 1030 -- -- -- 97 (!) 25 (!) 91 %   05/08/22 1015 (!) 128/108 -- -- (!) 53 (!) 26 (!) 91 %   05/08/22 1000 -- (!) 95.9 °F (35.5 °C) Bladder 62 (!) 21 (!) 94 %   05/08/22 0945 -- -- -- (!) 51 (!) 25 (!) 93 %   05/08/22 0930 -- -- -- (!) 53 (!) 26 (!) 92 %   05/08/22 0915 -- -- -- (!) 49 19 (!) 93 %   05/08/22 0900 -- (!) 95.7 °F (35.4 °C) Bladder (!) 49 (!) 24 (!) 93 %   05/08/22 0845 -- -- -- (!) 48 (!) 24 (!) 92 %   05/08/22 0830 -- -- -- 60 (!) 25 (!) 92 %   05/08/22 0815 -- -- -- (!) 57 (!) 26 (!) 94 %   05/08/22 0813 (!) 161/60 -- -- -- -- --   05/08/22 0800 (!) 161/60 96.1 °F (35.6 °C) Bladder (!) 57  (!) 31 (!) 94 %   05/08/22 0745 -- -- -- (!) 54 (!) 25 (!) 90 %     Recent Results (from the past 24 hour(s))   APTT    Collection Time: 05/08/22  7:23 AM   Result Value Ref Range    PTT 38.7 (H) 23.2 - 33.7 seconds   Protime-INR    Collection Time: 05/08/22  7:23 AM   Result Value Ref Range    PT 19.8 (H) 12.5 - 14.5 seconds    INR 1.70 (H) 0.00 - 1.30   CBC with Differential    Collection Time: 05/08/22  7:23 AM   Result Value Ref Range    WBC 7.8 4.5 - 11.5 x10(3)/mcL    RBC 5.04 4.20 - 5.40 x10(6)/mcL    Hgb 14.2 12.0 - 16.0 gm/dL    Hct 44.7 37.0 - 47.0 %    MCV 88.7 80.0 - 94.0 fL    MCH 28.2 27.0 - 31.0 pg    MCHC 31.8 (L) 33.0 - 36.0 mg/dL    RDW 13.0 11.5 - 17.0 %    Platelet 161 130 - 400 x10(3)/mcL    MPV 13.2 (H) 9.4 - 12.4 fL    Neutro Auto 80.3 %    Lymph Auto 14.7 %    Mono Auto 3.4 %    Eos Auto 0.6 %    Basophil Auto 0.5 %    Abs Lymph 1.15 0.6 - 4.6 x10(3)/mcL    Abs Neutro 6.3 2.1 - 9.2 x10(3)/mcL    Abs Mono 0.27 0.1 - 1.3 x10(3)/mcL    Abs Eos 0.05 0 - 0.9 x10(3)/mcL    Abs Baso 0.04 0 - 0.2 x10(3)/mcL    IG# 0.04 (H) 0 - 0.0155 x10(3)/mcL    IG% 0.5 (H) 0 - 0.43 %    NRBC% 0.0 %   Manual Differential    Collection Time: 05/08/22  7:23 AM   Result Value Ref Range    Neut Man 78 %    Lymph Man 9 %    Monocyte Man 3 %    Eos Man 1 %    [place] Man 8 %    Instr WBC 7.8 x10(3)/mcL    Abs Mono Man 234 100 - 1,300 /mcL    Abs Eos Man 78 0 - 900 /mcL    Abs Lymp Man 702 (L) 3,400 - 13,700 /mcL    Abs Neut 6.7 2.1 - 9.2 x10(3)/mcL    Platelet Est Decreased (A) Adequate    RBC Morph Abnormal (A) Normal    Poik 1+ (A) (none)    Joanne Cells 1+ (A) (none)   POCT glucose    Collection Time: 05/08/22  7:29 AM   Result Value Ref Range    POCT Glucose >500 (H) 70 - 110 mg/dL   Echo    Collection Time: 05/08/22  7:36 AM   Result Value Ref Range    BSA 2.14 m2    TDI SEPTAL 0.08 m/s    LV LATERAL E/E' RATIO 9.25 m/s    LV SEPTAL E/E' RATIO 9.25 m/s    EF 45 %    Left Ventricular Outflow Tract Mean Velocity 0.745 cm/s     Left Ventricular Outflow Tract Mean Gradient 2.00 mmHg    TDI LATERAL 0.08 m/s    PV PEAK VELOCITY 1.29 cm/s    LA size 3.10 cm    AV mean gradient 5 mmHg    AV valve area 2.39 cm2    AV Velocity Ratio 0.68     AV index (prosthetic) 0.76     MV mean gradient 1 mmHg    MV valve area p 1/2 method 2.42 cm2    MV valve area by continuity eq 1.46 cm2    E/A ratio 1.00     Mean e' 0.08 m/s    E wave deceleration time 277 msec    LVOT diameter 2.00 cm    LVOT area 3.1 cm2    LVOT peak bartolome 0.93 m/s    LVOT peak VTI 17.30 cm    Ao peak bartolome 1.37 m/s    Ao VTI 22.7 cm    LVOT stroke volume 54.32 cm3    AV peak gradient 8 mmHg    MV peak gradient 5 mmHg    E/E' ratio 9.25 m/s    MV Peak E [place] 0.74 m/s    MV VTI 37.2 cm    MV stenosis pressure 1/2 time 91 ms    MV Peak A [place]olome 0.74 m/s    LV Systolic Volume 33.90 mL    LV Systolic Volume Index 16.5 mL/m2    LV Diastolic Volume 59.40 mL    LV Diastolic Volume Index 28.83 mL/m2    LA Volume Index (Mod) 5.3 mL/m2    LA volume (mod) 10.90 cm3   POCT glucose    Collection Time: 05/08/22  8:58 AM   Result Value Ref Range    POCT Glucose >500 (H) 70 - 110 mg/dL   COVID-19 Routine Screening    Collection Time: 05/08/22  9:08 AM   Result Value Ref Range    SARS-CoV-2 PCR Not Detected Not Detected   POCT glucose    Collection Time: 05/08/22  9:56 AM   Result Value Ref Range    POCT Glucose >500 (H) 70 - 110 mg/dL   POCT Glucose, Hand-Held Device    Collection Time: 05/08/22 10:05 AM   Result Value Ref Range    POC Glucose >500 70 - 110 MG/DL   POCT ARTERIAL BLOOD GAS Blood Gas, Lytes (NA,K,ICA,HCT)    Collection Time: 05/08/22 10:35 AM   Result Value Ref Range    PH ARTERIAL 7.18 (AA)     PCO2 ARTERIAL 44     PO2 ARTERIAL 154     Sodium 135     Potassium, Bld 2.8     Ionized Calcium 1.05 mmol/L    HCO3, Arterial 16.4 (L) 18.0 - 23.0 MMOL/L    CO2 TOTAL 17.8     Base Excess, Arterial -11.6 (A) -2.0 - 2.0 mmol/L    % Saturation 99    POCT glucose    Collection Time: 05/08/22 11:08 AM    Result Value Ref Range    POCT Glucose >500 (H) 70 - 110 mg/dL   Basic Metabolic Panel    Collection Time: 05/08/22 11:25 AM   Result Value Ref Range    Sodium Level 142 136 - 145 mmol/L    Potassium Level 2.7 (LL) 3.5 - 5.1 mmol/L    Chloride 99 98 - 107 mmol/L    Carbon Dioxide 18 (L) 23 - 31 mmol/L    Glucose Level 800 (HH) 82 - 115 mg/dL    Blood Urea Nitrogen 26.9 (H) 9.8 - 20.1 mg/dL    Creatinine 1.93 (H) 0.55 - 1.02 mg/dL    BUN/Creatinine Ratio 14     Calcium Level Total 7.9 (L) 8.4 - 10.2 mg/dL    Estimated GFR-Non  27 mls/min/1.73/m2    Anion Gap 25.0 mEq/L   Magnesium    Collection Time: 05/08/22 11:25 AM   Result Value Ref Range    Magnesium Level 2.30 1.60 - 2.60 mg/dL   Phosphorus    Collection Time: 05/08/22 11:25 AM   Result Value Ref Range    Phosphorus Level 3.4 2.3 - 4.7 mg/dL   Lactic Acid, Plasma    Collection Time: 05/08/22 11:25 AM   Result Value Ref Range    Lactic Acid Level 11.9 (HH) 0.5 - 2.2 mmol/L   APTT    Collection Time: 05/08/22 11:25 AM   Result Value Ref Range    PTT 84.0 (HH) 23.2 - 33.7 seconds   POCT glucose    Collection Time: 05/08/22 12:18 PM   Result Value Ref Range    POCT Glucose >500 (H) 70 - 110 mg/dL   POCT glucose    Collection Time: 05/08/22  1:09 PM   Result Value Ref Range    POCT Glucose >500 (H) 70 - 110 mg/dL   MRSA PCR    Collection Time: 05/08/22  2:00 PM   Result Value Ref Range    MRSA PCR SCRN ([facility]) Not Detected Not Detected   POCT glucose    Collection Time: 05/08/22  2:14 PM   Result Value Ref Range    POCT Glucose >500 (H) 70 - 110 mg/dL   Basic Metabolic Panel    Collection Time: 05/08/22  2:48 PM   Result Value Ref Range    Sodium Level 140 136 - 145 mmol/L    Potassium Level 3.0 (L) 3.5 - 5.1 mmol/L    Chloride 101 98 - 107 mmol/L    Carbon Dioxide 15 (L) 23 - 31 mmol/L    Glucose Level 722 (HH) 82 - 115 mg/dL    Blood Urea Nitrogen 26.0 (H) 9.8 - 20.1 mg/dL    Creatinine 2.01 (H) 0.55 - 1.02 mg/dL    BUN/Creatinine Ratio 13      Calcium Level Total 8.0 (L) 8.4 - 10.2 mg/dL    Estimated GFR-Non  26 mls/min/1.73/m2    Anion Gap 24.0 mEq/L   Magnesium    Collection Time: 05/08/22  2:48 PM   Result Value Ref Range    Magnesium Level 2.10 1.60 - 2.60 mg/dL   Phosphorus    Collection Time: 05/08/22  2:48 PM   Result Value Ref Range    Phosphorus Level 2.4 2.3 - 4.7 mg/dL   Lactic Acid, Plasma    Collection Time: 05/08/22  2:48 PM   Result Value Ref Range    Lactic Acid Level 12.4 (HH) 0.5 - 2.2 mmol/L   APTT    Collection Time: 05/08/22  2:48 PM   Result Value Ref Range    PTT >200.0 (HH) 23.2 - 33.7 seconds   POCT glucose    Collection Time: 05/08/22  3:06 PM   Result Value Ref Range    POCT Glucose >500 (H) 70 - 110 mg/dL   Basic Metabolic Panel    Collection Time: 05/08/22  3:34 PM   Result Value Ref Range    Sodium Level 141 136 - 145 mmol/L    Potassium Level 3.1 (L) 3.5 - 5.1 mmol/L    Chloride 103 98 - 107 mmol/L    Carbon Dioxide 14 (L) 23 - 31 mmol/L    Glucose Level 685 (HH) 82 - 115 mg/dL    Blood Urea Nitrogen 26.2 (H) 9.8 - 20.1 mg/dL    Creatinine 2.00 (H) 0.55 - 1.02 mg/dL    BUN/Creatinine Ratio 13     Calcium Level Total 7.8 (L) 8.4 - 10.2 mg/dL    Estimated GFR-Non  26 mls/min/1.73/m2    Anion Gap 24.0 mEq/L   Magnesium    Collection Time: 05/08/22  3:34 PM   Result Value Ref Range    Magnesium Level 2.10 1.60 - 2.60 mg/dL   Phosphorus    Collection Time: 05/08/22  3:34 PM   Result Value Ref Range    Phosphorus Level 2.5 2.3 - 4.7 mg/dL   POCT ARTERIAL BLOOD GAS Blood Gas    Collection Time: 05/08/22  4:04 PM   Result Value Ref Range    PH ARTERIAL 7.19     PCO2 ARTERIAL 50     PO2 ARTERIAL 148     Sodium 139     Potassium, Bld 3.0     Ionized Calcium 1.04 mmol/L    HCO3, Arterial 19.1 18.0 - 23.0 MMOL/L    CO2 TOTAL 20.6     Base Excess, Arterial -9.1 (A) -2.0 - 2.0 mmol/L    % Saturation 99    POCT glucose    Collection Time: 05/08/22  4:32 PM   Result Value Ref Range    POCT Glucose >500 (H) 70  - 110 mg/dL   POCT glucose    Collection Time: 05/08/22  5:16 PM   Result Value Ref Range    POCT Glucose >500 (H) 70 - 110 mg/dL   Basic Metabolic Panel    Collection Time: 05/08/22  6:03 PM   Result Value Ref Range    Sodium Level 142 136 - 145 mmol/L    Potassium Level 3.1 (L) 3.5 - 5.1 mmol/L    Chloride 102 98 - 107 mmol/L    Carbon Dioxide 18 (L) 23 - 31 mmol/L    Glucose Level 486 (HH) 82 - 115 mg/dL    Blood Urea Nitrogen 25.7 (H) 9.8 - 20.1 mg/dL    Creatinine 1.98 (H) 0.55 - 1.02 mg/dL    BUN/Creatinine Ratio 13     Calcium Level Total 7.7 (L) 8.4 - 10.2 mg/dL    Estimated GFR-Non  26 mls/min/1.73/m2    Anion Gap 22.0 mEq/L   Magnesium    Collection Time: 05/08/22  6:03 PM   Result Value Ref Range    Magnesium Level 2.10 1.60 - 2.60 mg/dL   Phosphorus    Collection Time: 05/08/22  6:03 PM   Result Value Ref Range    Phosphorus Level 2.0 (L) 2.3 - 4.7 mg/dL   Lactic Acid, Plasma    Collection Time: 05/08/22  6:03 PM   Result Value Ref Range    Lactic Acid Level 12.5 (HH) 0.5 - 2.2 mmol/L   APTT    Collection Time: 05/08/22  6:03 PM   Result Value Ref Range    .7 (HH) 23.2 - 33.7 seconds   POCT glucose    Collection Time: 05/08/22  6:04 PM   Result Value Ref Range    POCT Glucose >500 (H) 70 - 110 mg/dL   POCT ARTERIAL BLOOD GAS Blood Gas, Lytes (NA,K,ICA,HCT)    Collection Time: 05/08/22  7:50 PM   Result Value Ref Range    pH, Arterial 7.27 (A) 7.35 - 7.45    pCO2, Arterial 54 (A) 35 - 45 mm Hg    pO2, Arterial 92 83 - 108 mm Hg    Sodium 144 137 - 147 mmol/L    Potassium 3.0 (A) 3.4 - 5.3 mmol/L    Ionized Calcium 0.97 mmol/L    HCO3, Arterial 24.8 (A) 18.0 - 23.0 MMOL/L    CO2 TOTAL 26.5     Base Excess, Arterial -2.7 (A) -2.0 - 2.0 mmol/L    O2 Sat, Arterial 96 95 - 98 %   POCT glucose    Collection Time: 05/08/22  8:43 PM   Result Value Ref Range    POCT Glucose >500 (H) 70 - 110 mg/dL   APTT    Collection Time: 05/08/22  9:08 PM   Result Value Ref Range    PTT 65.8 (H) 23.2 -  33.7 seconds   Lactic Acid, Plasma    Collection Time: 05/08/22  9:38 PM   Result Value Ref Range    Lactic Acid Level 10.8 (HH) 0.5 - 2.2 mmol/L   POCT glucose    Collection Time: 05/08/22  9:45 PM   Result Value Ref Range    POCT Glucose 476 (HH) 70 - 110 mg/dL   POCT glucose    Collection Time: 05/08/22 11:10 PM   Result Value Ref Range    POCT Glucose 357 (H) 70 - 110 mg/dL   Basic Metabolic Panel    Collection Time: 05/09/22 12:06 AM   Result Value Ref Range    Sodium Level 149 (H) 136 - 145 mmol/L    Potassium Level 3.5 3.5 - 5.1 mmol/L    Chloride 106 98 - 107 mmol/L    Carbon Dioxide 19 (L) 23 - 31 mmol/L    Glucose Level 296 (H) 82 - 115 mg/dL    Blood Urea Nitrogen 24.7 (H) 9.8 - 20.1 mg/dL    Creatinine 1.78 (H) 0.55 - 1.02 mg/dL    BUN/Creatinine Ratio 14     Calcium Level Total 7.8 (L) 8.4 - 10.2 mg/dL    Estimated GFR-Non  30 mls/min/1.73/m2    Anion Gap 24.0 mEq/L   Magnesium    Collection Time: 05/09/22 12:06 AM   Result Value Ref Range    Magnesium Level 2.00 1.60 - 2.60 mg/dL   Phosphorus    Collection Time: 05/09/22 12:06 AM   Result Value Ref Range    Phosphorus Level 2.3 2.3 - 4.7 mg/dL   CBC with Differential    Collection Time: 05/09/22 12:06 AM   Result Value Ref Range    WBC 20.2 (H) 4.5 - 11.5 x10(3)/mcL    RBC 4.91 4.20 - 5.40 x10(6)/mcL    Hgb 13.9 12.0 - 16.0 gm/dL    Hct 42.5 37.0 - 47.0 %    MCV 86.6 80.0 - 94.0 fL    MCH 28.3 27.0 - 31.0 pg    MCHC 32.7 (L) 33.0 - 36.0 mg/dL    RDW 13.3 11.5 - 17.0 %    Platelet 142 130 - 400 x10(3)/mcL    MPV 13.2 (H) 9.4 - 12.4 fL    Neutro Auto 90.7 %    Lymph Auto 5.8 %    Mono Auto 1.4 %    Eos Auto 0.0 %    Basophil Auto 0.4 %    Abs Lymph 1.17 0.6 - 4.6 x10(3)/mcL    Abs Neutro 18.3 (H) 2.1 - 9.2 x10(3)/mcL    Abs Mono 0.29 0.1 - 1.3 x10(3)/mcL    Abs Eos 0.00 0 - 0.9 x10(3)/mcL    Abs Baso 0.09 0 - 0.2 x10(3)/mcL    IG# 0.34 (H) 0 - 0.0155 x10(3)/mcL    IG% 1.7 (H) 0 - 0.43 %    NRBC% 0.0 %   Manual Differential    Collection  Time: 05/09/22 12:06 AM   Result Value Ref Range    Neut Man 82 %    Lymph Man 3 %    Monocyte Man 2 %    [place] Man 12 %    Myelo Man 2 %    Platelet Est Adequate Adequate    RBC Morph Abnormal (A) Normal    Poik 1+ (A) (none)    Macrocyte 1+ (A) (none)    Tear drop cell 1+ (A) (none)   POCT glucose    Collection Time: 05/09/22 12:17 AM   Result Value Ref Range    POCT Glucose 308 (H) 70 - 110 mg/dL   POCT glucose    Collection Time: 05/09/22  1:43 AM   Result Value Ref Range    POCT Glucose 250 (H) 70 - 110 mg/dL   POCT glucose    Collection Time: 05/09/22  2:40 AM   Result Value Ref Range    POCT Glucose 209 (H) 70 - 110 mg/dL   Phosphorus    Collection Time: 05/09/22  3:09 AM   Result Value Ref Range    Phosphorus Level 2.6 2.3 - 4.7 mg/dL   Lactic Acid, Plasma    Collection Time: 05/09/22  3:09 AM   Result Value Ref Range    Lactic Acid Level 9.1 (HH) 0.5 - 2.2 mmol/L   CBC with Differential    Collection Time: 05/09/22  3:09 AM   Result Value Ref Range    WBC 19.7 (H) 4.5 - 11.5 x10(3)/mcL    RBC 4.78 4.20 - 5.40 x10(6)/mcL    Hgb 13.4 12.0 - 16.0 gm/dL    Hct 41.7 37.0 - 47.0 %    MCV 87.2 80.0 - 94.0 fL    MCH 28.0 27.0 - 31.0 pg    MCHC 32.1 (L) 33.0 - 36.0 mg/dL    RDW 13.2 11.5 - 17.0 %    Platelet 119 (L) 130 - 400 x10(3)/mcL    MPV 13.2 (H) 9.4 - 12.4 fL    Neutro Auto 88.8 %    Lymph Auto 6.5 %    Mono Auto 1.8 %    Eos Auto 0.0 %    Basophil Auto 0.4 %    Abs Lymph 1.28 0.6 - 4.6 x10(3)/mcL    Abs Neutro 17.5 (H) 2.1 - 9.2 x10(3)/mcL    Abs Mono 0.35 0.1 - 1.3 x10(3)/mcL    Abs Eos 0.00 0 - 0.9 x10(3)/mcL    Abs Baso 0.08 0 - 0.2 x10(3)/mcL    IG# 0.50 (H) 0 - 0.0155 x10(3)/mcL    IG% 2.5 (H) 0 - 0.43 %    NRBC% 0.0 %   Troponin I    Collection Time: 05/09/22  3:09 AM   Result Value Ref Range    Troponin-I 14.614 (H) 0.000 - 0.045 ng/mL   POCT glucose    Collection Time: 05/09/22  3:34 AM   Result Value Ref Range    POCT Glucose 194 (H) 70 - 110 mg/dL   POCT glucose    Collection Time: 05/09/22  5:03 AM    Result Value Ref Range    POCT Glucose 143 (H) 70 - 110 mg/dL   POCT glucose    Collection Time: 05/09/22  6:08 AM   Result Value Ref Range    POCT Glucose 167 (H) 70 - 110 mg/dL   POCT glucose    Collection Time: 05/09/22  7:10 AM   Result Value Ref Range    POCT Glucose 126 (H) 70 - 110 mg/dL     [unfilled]  Wt Readings from Last 3 Encounters:   05/08/22 100 kg (220 lb 7.4 oz)       Physical Exam:  General: sedated  Neck: No carotid bruit, no jugular venous distention.  Respiratory: Breath sounds are equal, symmetrical chest wall expansion. Breath sounds are coarse. Intubated.   Cardiovascular: tachy rate, regular rhythm. No murmur. No gallop. Generalized edema noted. Patient is ST with RBBB on tele.  Integumentary: Clean, warm, dry, and intact.  Neurologic: sedated  Psychiatric: sedated        Assessment/Plan:    1. Cardiopulmonary arrest  -s/p CPR with ROSC x 2  -likely with anoxic brain injury at this time  -continue vasopressor support as indicated  -therapeutic hypothermia initiated - now re-warming  -further plan per ICU MD    2. NSTEMI  -EKG changes with RBBB on presentation to ER  -troponin 0.136 -> 42.34  -on heparin gtt; can be d/c given bleeding in OGT  -ok to continue Aspirin; can d/c Plavix  -not candidate for invasive evaluation given poor neurologic status    3. CMO  -echo with LVEF 40-45%  -unable to tolerate medical therapy given hypotension and RICO    4. RICO  -renal indices improving    5. DM  -on insulin gtt  -per primary medical team    6. Lactic acidosis, possible aspiration PNA  -per primary  -on antibiotic therapy            Elizabeth Melo, APRN, FNP-C  [facility]
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
## 2022-05-09 NOTE — PLAN OF CARE
Patient being turned [id number] and explained to family the relevance of turning and elevating heels. Patient communication impaired based on ventilation, explained to family progress of weaning sedation and reassessing neurological status.

## 2022-05-09 NOTE — PROGRESS NOTES
[facility] [address]  Critical Care - Medicine  Progress Note    Patient Name: Millicent Arambula  MRN: [id number]  Admission Date: 5/8/2022  Hospital Length of Stay: 1 days  Code Status: DNR  Attending Provider: DERRICK Andrea MD  Primary Care Provider: Primary Doctor No   Principal Problem: Cardiopulmonary arrest    Subjective:     HPI: Millicent Arambula is a 72 y.o.   female who with a history of diabetes and hypertension reportedly is mildly uncomfortable  earlier to night before falling asleep, 1 hour after falling asleep she woke up with acute onset SOB.  By the time she walked to the car to come to hospital she passed out, which prompted her  to call EMS.  Upon EMS arrival patient was in agonal breathing, bradycardic.  Upon arrival of the patient to our ER, there is no pulse, patient was in asystole/pea.  Code blue was called in, patient was coded for about 15 minute and was emergently intubated, for airway protection achieved ROSC. Per ED doc noted to have significant vomitus during intubation concerning for aspiration.   Upon stabilizing she was seen in CT scan where she coded again.  Labs on admission significant for blood sugars in 500s, acidosis with bicarb of 14, mildly elevated troponin of 0.87, , EKG Wide QRS with left axis deviation and RBBB, cards consulted by Er Doc no acute interventions recommended.  CT head negative for any stroke, CT PE with no evidence of PE but diffuse infiltrates bilaterally from apex to bases, also concern for extensive pulmonary vascular congestion..  Patient was further admitted to ICU mechanically ventilated status post cardiac arrest.    Hospital/ICU Course:  Patient continues in the hypothermia protocol.  Rewarming underway.  Remains on low-dose propofol fall without awakening.  Also remains on mechanical ventilation.  Acidosis has improved based on ABGs.  CBG is also better controlled.  Appreciate cardiology's input and  recommendations.      Objective:     Vital Signs (Most Recent):  Temp: 96.8 °F (36 °C) (05/09/22 0700)  Pulse: (!) 116 (05/09/22 0630)  Resp: 20 (05/09/22 0630)  BP: 120/64 (05/09/22 0600)  SpO2: 95 % (05/09/22 0630) Vital Signs (24h Range):  Temp:  [96.1 °F (35.6 °C)-97.3 °F (36.3 °C)] 96.8 °F (36 °C)  Pulse:  [] 116  Resp:  [12-34] 20  SpO2:  [89 %-98 %] 95 %  BP: (106-145)/() 120/64  Arterial Line BP: ()/(52-73) 103/56     Weight: 100 kg (220 lb 7.4 oz)  Body mass index is 36.69 kg/m².     Physical exam:  General-elderly woman sedated on the ventilator  HEENT-endotracheal tube in the oropharynx.  Nasogastric tube in the left nares  Neck trachea in the midline  Chest-equal bilateral breath sounds noted.  Rhonchi are heard anteriorly  Cardiovascular-regular tachycardia.  Distant heart sounds but no audible murmurs gallops or rubs heard.  Abdomen-obese bowel sounds present.  Soft nontender  Extremities-no peripheral cyanosis or clubbing.  2+ edema  Neuro-difficult to assess due to sedation      Intake/Output Summary (Last 24 hours) at 5/9/2022 1022  Last data filed at 5/9/2022 0600  Gross per 24 hour   Intake 4003 ml   Output 1305 ml   Net 2698 ml            Current Facility-Administered Medications:     dextrose 10 % infusion, , Intravenous, PRN, Kandy Eden MD    dextrose 10 % infusion, , Intravenous, PRN, Kandy Eden MD    dextrose 10% bolus 125 mL, 12.5 g, Intravenous, PRN, Kandy Edne MD    dextrose 10% bolus 250 mL, 25 g, Intravenous, PRN, Kandy Eden MD    doxycycline (VIBRAMYCIN) 100 mg in dextrose 5 % 250 mL IVPB, 100 mg, Intravenous, Q12H, Kandy Eden MD, Stopped at 05/09/22 0621    EPINEPHrine (ADRENALIN) 5 mg in dextrose 5 % 250 mL infusion, 0-2 mcg/kg/min, Intravenous, Continuous, Kandy Eden MD    hydrALAZINE injection 10 mg, 10 mg, Intravenous, Q4H PRN, Kandy Eden MD    insulin regular bolus from bag/infusion 10 Units,  0.1 Units/kg, Intravenous, Once, Kandy Eden MD    insulin regular in 0.9 % NaCl 100 unit/100 mL (1 unit/mL) infusion, 2 Units/hr, Intravenous, Continuous, Kandy Eden MD, Last Rate: 4 mL/hr at 05/09/22 0705, 4 Units/hr at 05/09/22 0705    levETIRAcetam (KEPPRA) 500 mg in sodium chloride 0.9 % 100 mL IVPB (MB+), 500 mg, Intravenous, Q12H, Kandy Eden MD, 500 mg at 05/09/22 0823    lorazepam injection 2 mg, 2 mg, Intravenous, Q10 Min PRN, Kandy Eden MD    melatonin tablet 6 mg, 6 mg, Oral, Nightly PRN, Kandy Eden MD    NORepinephrine bitartrate-NaCl 8 mg/250 mL (32 mcg/mL) infusion, 0.05 mcg/kg/min, Intravenous, Continuous, Pankaj Parsons MD, Last Rate: 37.5 mL/hr at 05/09/22 0517, 0.2 mcg/kg/min at 05/09/22 0517    piperacillin-tazobactam (ZOSYN) 4.5 g in sodium chloride 0.9 % 100 mL IVPB (MB+), 4.5 g, Intravenous, Q8H, Kandy Eden MD, Last Rate: 25 mL/hr at 05/09/22 0826, 4.5 g at 05/09/22 0826    propofol (DIPRIVAN) 10 mg/mL infusion, 0-50 mcg/kg/min, Intravenous, Continuous, Kandy Eden MD, Last Rate: 9 mL/hr at 05/09/22 0823, 15 mcg/kg/min at 05/09/22 0823    sodium chloride 0.9% flush 10 mL, 10 mL, Intravenous, PRN, Kandy Eden MD    Flushing PICC Protocol, , , Until Discontinued **AND** sodium chloride 0.9% flush 10 mL, 10 mL, Intravenous, Q6H, 10 mL at 05/09/22 0539 **AND** sodium chloride 0.9% flush 10 mL, 10 mL, Intravenous, PRN, W. Alden Brown, MD     Vents:  Vent Mode: VOLUME A/C (05/09/22 0427)  Set Rate: 32 BPM (05/09/22 0427)  Vt Set: 420 mL (05/09/22 0427)  PEEP/CPAP: 12 cmH20 (05/09/22 0427)  Oxygen Concentration (%): 100 (05/08/22 2000)  Peak Airway Pressure: 30 cmH2O (05/09/22 0427)  Total Ve: 11.9 mL (05/09/22 0427)  F/VT Ratio<105 (RSBI): (!) 92.9 (05/09/22 0000)    Lines/Drains/Airways     Peripherally Inserted Central Catheter Line  Duration           PICC Triple Lumen 05/08/22 0841 left basilic 1 day          Drain   Duration                NG/OG Tube 05/08/22 0110 [place] sump 18 Fr. Right mouth 1 day         Urethral Catheter 05/08/22 0109 Temperature probe 16 Fr. 1 day          Airway  Duration                Airway - Non-Surgical 05/08/22 0105 Endotracheal Tube 1 day          Arterial Line  Duration           Arterial Line 05/08/22 0530 Right Radial 1 day          Peripheral Intravenous Line  Duration                Peripheral IV - Single Lumen 05/08/22 0058 20 G Right Antecubital 1 day         Peripheral IV - Single Lumen 05/08/22 0211 20 G Left Wrist 1 day         Peripheral IV - Single Lumen 05/08/22 0500 20 G Left Antecubital 1 day                Significant Labs:    Lab Results   Component Value Date    WBC 19.7 (H) 05/09/2022    HGB 13.4 05/09/2022    HCT 41.7 05/09/2022    MCV 87.2 05/09/2022         BMP  Lab Results   Component Value Date     05/09/2022    K 3.3 05/09/2022    CO2 24 05/09/2022    BUN 25.0 (H) 05/09/2022    CREATININE 1.56 (H) 05/09/2022    CALCIUM 7.4 (L) 05/09/2022    EGFRNONAA 35 05/09/2022       ABG  No results for input(s): PH, PO2, PCO2, HCO3, BE in the last 168 hours.        Significant Imaging:  No chest x-ray today    DVT Prophylaxis:  Heparin drip  GI prophylaxis:  Protonix    Assessment/Plan:     Cardiac arrest-likely prolonged at least 15 minutes prior to ROSC.  Probable anoxic brain injury as a result.  Acute respiratory failure-continues to require high-flow O2.  Acute renal insufficiency-likely secondary to ATN from hypoperfusion.    Family at the bedside today.  I discussed current condition and potential for anoxic brain injury related to her cardiac arrest.  We will complete the hypothermia protocol and wean sedation for adequate neuro assessment.  Prognosis appears poor.  Continue present ventilator settings and begin tube feedings as tolerated.  Stop bicarbonate infusion and replace potassium.         Greater than 30 minutes of critical care was time spent personally by me on  the following activities: development of treatment plan with patient or surrogate and bedside caregivers, discussions with consultants, evaluation of patient's response to treatment, examination of patient, ordering and performing treatments and interventions, ordering and review of laboratory studies, ordering and review of radiographic studies, pulse oximetry, re-evaluation of patient's condition.  This critical care time did not overlap with that of any other provider or involve time for any procedures.     HUMA Andrea MD  Critical Care - Medicine  [facility] - [facility] ICU

## 2022-05-10 NOTE — PROGRESS NOTES
"[facility] [address]  Adult Nutrition  Progress Note    SUMMARY       Recommendations    Recommendation/Intervention:     1. Peptamen Intense VHP @ 55ml/hr (goal rate, based on tube feeding running ~20 hours/day)    Goals: Provide adequate nutrition to meet est needs.  Nutrition Goal Status: new  Communication of KAE Recs: reviewed with RN    Reason for Assessment    Reason For Assessment: other (see comments) (vent)  Diagnosis:  (Cardiac arrest-Probable anoxic brain injury as a result.  Acute respiratory failure.  Acute renal insufficiency-likely secondary to ATN from hypoperfusion)  Relevant Medical History: DM, HTN    General Information Comments: Spoke with RN, plans for starting tube feeding today. Receiving some kcal from meds.    Nutrition Risk Screen    Nutrition Risk Screen: tube feeding or parenteral nutrition    Nutrition/Diet History    Factors Affecting Nutritional Intake: on mechanical ventilation    Anthropometrics    Temp: 97.5 °F (36.4 °C)  Height Method: Estimated  Height: 5' 5" (165.1 cm)  Height (inches): 65 in  Weight Method: Estimated  Weight: 100 kg (220 lb 7.4 oz)  Weight (lb): 220.46 lb  Ideal Body Weight (IBW), Female: 125 lb  % Ideal Body Weight, Female (lb): 176.37 %  BMI (Calculated): 36.7  BMI Grade: 35 - 39.9 - obesity - grade II     Lab/Procedures/Meds    Pertinent Labs Reviewed: reviewed  Pertinent Labs Comments: 5/10/22 Glu 194, BUN 29.4, Crea 1.81, GFR 29  Pertinent Medications Reviewed: reviewed  Pertinent Medications Comments: insulin drip, levophed @ 0.26mcg/kg/min, diprivan @ 12ml/hr    Estimated/Assessed Needs    Weight Used For Calorie Calculations: 100 kg (220 lb 7.4 oz)  Energy Calorie Requirements (kcal): 1100-1400kcal (11-14kcal/kg)  Energy Need Method: Kcal/kg  Protein Requirements: 114gm  Weight Used For Protein Calculations: 56.7 kg (125 lb) (IBW)  Fluid Requirements (mL): 2000ml  Estimated Fluid Requirement Method: other (see comments) " (20ml/kg)    Nutrition Prescription Ordered    Current Diet Order: NPO    Evaluation of Received Nutrient/Fluid Intake    Lipid Calories (kcals): 315 kcals  Energy Calories Required: not meeting needs  Protein Required: not meeting needs  % Intake of Estimated Energy Needs: 0 - 25 %  % Meal Intake: NPO    Nutrition Risk    Level of Risk/Frequency of Follow-up: high     Monitor and Evaluation    Food and Nutrient Intake: enteral nutrition intake  Food and Nutrient Adminstration: enteral and parenteral nutrition administration     Nutrition Follow-Up    RD Follow-up?: Yes

## 2022-05-10 NOTE — PLAN OF CARE
Problem: Adult Inpatient Plan of Care  Goal: Patient-Specific Goal (Individualized)  Outcome: Ongoing, Progressing  Goal: Absence of Hospital-Acquired Illness or Injury  Outcome: Ongoing, Progressing  Goal: Optimal Comfort and Wellbeing  Outcome: Ongoing, Progressing  Goal: Readiness for Transition of Care  Outcome: Ongoing, Progressing     Problem: Infection  Goal: Absence of Infection Signs and Symptoms  Outcome: Ongoing, Progressing     Problem: Skin and Tissue Injury (Mechanical Ventilation, Invasive)  Goal: Absence of Device-Related Skin and Tissue Injury  Outcome: Ongoing, Progressing     Problem: Ventilator-Induced Lung Injury (Mechanical Ventilation, Invasive)  Goal: Absence of Ventilator-Induced Lung Injury  Outcome: Ongoing, Progressing     Problem: Skin and Tissue Injury (Artificial Airway)  Goal: Absence of Device-Related Skin or Tissue Injury  Outcome: Ongoing, Progressing     Problem: Fall Injury Risk  Goal: Absence of Fall and Fall-Related Injury  Outcome: Ongoing, Progressing

## 2022-05-10 NOTE — PROGRESS NOTES
[facility] - [facility]  Critical Care - Medicine  Progress Note    Patient Name: Millicent Arambula  MRN: [id number]  Admission Date: 5/8/2022  Hospital Length of Stay: 2 days  Code Status: DNR  Attending Provider: DERRICK Andrea MD  Primary Care Provider: Primary Doctor No   Principal Problem: Cardiopulmonary arrest    Subjective:     HPI: Millicent Arambula is a 72 y.o.   female who with a history of diabetes and hypertension presented with worsening shortness of breath about 1 hour prior to falling asleep. She lost consciousness on the way to the car and her  contacted EMS, upon arrival she was agonal breathing and bradycardic.  Upon arrival to the ER, she was in asystole/PEA. CODE BLUE was called and after about 15 minutes achieved ROSC.  Complicated by vomitus during code which was concerning for aspiration.  Upon stabilizing she was seen in CT scan where she coded again.  Labs on admission significant for blood sugars in 500s, acidosis with bicarb of 14, mildly elevated troponin of 0.87, , EKG Wide QRS with left axis deviation and RBBB, cards consulted by Er Doc no acute interventions recommended.  CT head negative for any stroke, CT PE with no evidence of PE but diffuse infiltrates bilaterally from apex to bases, also concern for extensive pulmonary vascular congestion.  Patient was further admitted to ICU mechanically ventilated status post cardiac arrest.    Interval History:  Patient remains in hypothermia protocol.  Currently on low dose propofol, upon attempts to wean patient becomes tachycardic.  Currently on mechanical ventilation with settings as below, PEEP of 12 and FiO2 of 40%.  Neurologically has pupil reflexes, no corneal, gag, cough, or withdrawal to pain.  Remains on insulin drip.        Objective:     Vital Signs (Most Recent):  Temp: 97.5 °F (36.4 °C) (05/10/22 0700)  Pulse: (!) 111 (05/10/22 0909)  Resp: (!) 32 (05/10/22 0909)  BP: 123/73 (05/10/22 0717)  SpO2:  99 % (05/10/22 0909) Vital Signs (24h Range):  Temp:  [97.3 °F (36.3 °C)-98.6 °F (37 °C)] 97.5 °F (36.4 °C)  Pulse:  [] 111  Resp:  [1-43] 32  SpO2:  [88 %-99 %] 99 %  BP: ()/() 123/73  Arterial Line BP: ()/(51-75) 128/74     Weight: 100 kg (220 lb 7.4 oz)  Body mass index is 36.69 kg/m².     Physical exam:  General-elderly woman sedated on the ventilator  HEENT-endotracheal tube in the oropharynx.  Nasogastric tube in the left nares  Neck trachea in the midline  Chest-equal bilateral breath sounds noted.  Rhonchi are heard bilaterally anteriorly  Cardiovascular-regular tachycardia.  Distant heart sounds but no audible murmurs gallops or rubs heard.  Abdomen-obese bowel sounds present.  Soft nontender  Extremities-no peripheral cyanosis or clubbing.  2+ edema  Neuro-difficult to assess due to sedation      Intake/Output Summary (Last 24 hours) at 5/10/2022 0957  Last data filed at 5/10/2022 0600  Gross per 24 hour   Intake 3197 ml   Output 1310 ml   Net 1887 ml        Current Facility-Administered Medications:     dextrose 10 % infusion, , Intravenous, PRN, Kandy Eedn MD    dextrose 10 % infusion, , Intravenous, PRN, Kandy Eden MD    dextrose 10% bolus 125 mL, 12.5 g, Intravenous, PRN, Kandy Eden MD    dextrose 10% bolus 250 mL, 25 g, Intravenous, PRN, Kandy Eden MD    doxycycline (VIBRAMYCIN) 100 mg in dextrose 5 % 250 mL IVPB, 100 mg, Intravenous, Q12H, Kandy Eden MD, Stopped at 05/10/22 0630    EPINEPHrine (ADRENALIN) 5 mg in dextrose 5 % 250 mL infusion, 0-2 mcg/kg/min, Intravenous, Continuous, Kandy Eden MD    hydrALAZINE injection 10 mg, 10 mg, Intravenous, Q4H PRN, Kandy Eden MD    insulin regular bolus from bag/infusion 10 Units, 0.1 Units/kg, Intravenous, Once, Kandy Eden MD    insulin regular in 0.9 % NaCl 100 unit/100 mL (1 unit/mL) infusion, 2 Units/hr, Intravenous, Continuous, Kandy Eden MD,  Last Rate: 3.8 mL/hr at 05/10/22 0600, 3.8 Units/hr at 05/10/22 0600    lactated ringers infusion, , Intravenous, Continuous, Kandy Eden MD, Last Rate: 125 mL/hr at 05/10/22 0604, New Bag at 05/10/22 0604    levETIRAcetam (KEPPRA) 500 mg in sodium chloride 0.9 % 100 mL IVPB (MB+), 500 mg, Intravenous, Q12H, Kandy Eden MD, 500 mg at 05/10/22 0822    lorazepam injection 2 mg, 2 mg, Intravenous, Q10 Min PRN, Kandy Eden MD    melatonin tablet 6 mg, 6 mg, Oral, Nightly PRN, Kandy Eden MD    NORepinephrine bitartrate-NaCl 8 mg/250 mL (32 mcg/mL) infusion, 0.05 mcg/kg/min, Intravenous, Continuous, Pankaj Parsons MD, Last Rate: 48.8 mL/hr at 05/10/22 0545, 0.26 mcg/kg/min at 05/10/22 0545    pantoprazole injection 40 mg, 40 mg, Intravenous, Daily, WMitch Andrea MD, 40 mg at 05/10/22 0822    piperacillin-tazobactam (ZOSYN) 4.5 g in sodium chloride 0.9 % 100 mL IVPB (MB+), 4.5 g, Intravenous, Q8H, Kandy Eden MD, Last Rate: 25 mL/hr at 05/10/22 0822, 4.5 g at 05/10/22 0822    propofol (DIPRIVAN) 10 mg/mL infusion, 0-50 mcg/kg/min, Intravenous, Continuous, Kandy Eden MD, Last Rate: 12 mL/hr at 05/10/22 0545, 20 mcg/kg/min at 05/10/22 0545    sodium chloride 0.9% flush 10 mL, 10 mL, Intravenous, PRN, Kandy Eden MD    Flushing PICC Protocol, , , Until Discontinued **AND** sodium chloride 0.9% flush 10 mL, 10 mL, Intravenous, Q6H, 10 mL at 05/10/22 0534 **AND** sodium chloride 0.9% flush 10 mL, 10 mL, Intravenous, PRN, DERRICK Andrea MD     Vents:  Vent Mode: VOLUME A/C (05/10/22 0909)  Set Rate: 32 BPM (05/10/22 0909)  Vt Set: 420 mL (05/10/22 0909)  PEEP/CPAP: 12 cmH20 (05/10/22 0909)  Oxygen Concentration (%): 50 (05/10/22 0909)  Peak Airway Pressure: 28 cmH2O (05/10/22 0909)  Total Ve: 11.6 mL (05/10/22 0909)  F/VT Ratio<105 (RSBI): (!) 88.89 (05/10/22 0909)    Lines/Drains/Airways     Peripherally Inserted Central Catheter Line  Duration            PICC Triple Lumen 05/08/22 0841 left basilic 2 days          Drain  Duration                NG/OG Tube 05/08/22 0110 [employer] sump 18 Fr. Right mouth 2 days         Urethral Catheter 05/08/22 0109 Temperature probe 16 Fr. 2 days         Rectal Tube 05/08/22 1200 1 day          Airway  Duration                Airway - Non-Surgical 05/08/22 0105 Endotracheal Tube 2 days          Arterial Line  Duration           Arterial Line 05/08/22 0530 Right Radial 2 days          Peripheral Intravenous Line  Duration                Peripheral IV - Single Lumen 05/08/22 0058 20 G Right Antecubital 2 days         Peripheral IV - Single Lumen 05/08/22 0211 20 G Left Wrist 2 days                Significant Labs:    Lab Results   Component Value Date    WBC 19.4 (H) 05/10/2022    HGB 12.2 05/10/2022    HCT 37.6 05/10/2022    MCV 86.2 05/10/2022         BMP  Lab Results   Component Value Date     05/09/2022    K 3.3 05/09/2022    CO2 21 (L) 05/10/2022    BUN 29.4 (H) 05/10/2022    CREATININE 1.81 (H) 05/10/2022    CALCIUM 7.0 (L) 05/10/2022    EGFRNONAA 29 05/10/2022     ABG  Recent Labs   Lab 05/10/22  0511   PH 7.45   PO2 256   PCO2 33     Significant Imaging:  No chest x-ray today    DVT Prophylaxis:  Heparin drip  GI prophylaxis:  Protonix    Assessment/Plan:     Cardiac arrest-likely prolonged at least 15 minutes prior to ROSC.  Probable anoxic brain injury as a result.  Acute respiratory failure-continues to require high-flow O2.  Acute renal insufficiency-likely secondary to ATN from hypoperfusion.      Continue hypothermia protocol and ventilatory support in ICU  Will wean Ventilatory support and sedation as tolerated, patient doing better from a respiratory standpoint  Continue neuro assesments  Will begin tube feeds today  Continue supportive measures, patient likely suffered anoxic brain injury.  Have discussed dismal prognosis with family as well as goals of care      Josiah Saxena MD  Critical Care - Medicine  [facility]  [address]

## 2022-05-10 NOTE — PROGRESS NOTES
Cardiology Daily Progress Note    Patient Name: Millicent Arambula  Age: 72 y.o.  : 1950  MRN: [id number]  Admission Date: 2022      Subjective: No acute cardiac events overnight. Patient remains NST. She remains intubated and sedated. Vitals are stable. PMS have consulted with the family, she is currently a DNR. Troponin has trended down.       Review of Systems - General ROS: unable to obtain due to patient clinical condition.       Health Status:  Review of patient's allergies indicates:   Allergen Reactions    Adhesive tape-silicones      Other reaction(s): SKIN COMES OFF       Current Facility-Administered Medications   Medication Dose Route Frequency Provider Last Rate Last Admin    dextrose 10 % infusion   Intravenous PRN Kandy Eden MD        dextrose 10 % infusion   Intravenous PRN Kandy Eden MD        dextrose 10% bolus 125 mL  12.5 g Intravenous PRN Kandy Eden MD        dextrose 10% bolus 250 mL  25 g Intravenous PRN Kandy Eden MD        doxycycline (VIBRAMYCIN) 100 mg in dextrose 5 % 250 mL IVPB  100 mg Intravenous Q12H Kandy Eden MD   Stopped at 05/10/22 0630    EPINEPHrine (ADRENALIN) 5 mg in dextrose 5 % 250 mL infusion  0-2 mcg/kg/min Intravenous Continuous Kandy Eden MD        hydrALAZINE injection 10 mg  10 mg Intravenous Q4H PRN Kandy Eden MD        insulin regular bolus from bag/infusion 10 Units  0.1 Units/kg Intravenous Once Kandy Eden MD        insulin regular in 0.9 % NaCl 100 unit/100 mL (1 unit/mL) infusion  2 Units/hr Intravenous Continuous Kandy Eden MD 3.8 mL/hr at 05/10/22 0600 3.8 Units/hr at 05/10/22 0600    lactated ringers infusion   Intravenous Continuous Kandy Eden  mL/hr at 05/10/22 0604 New Bag at 05/10/22 0604    levETIRAcetam (KEPPRA) 500 mg in sodium chloride 0.9 % 100 mL IVPB (MB+)  500 mg Intravenous Q12H Kandy Eden MD   500 mg at 22 2102     lorazepam injection 2 mg  2 mg Intravenous Q10 Min PRN Kandy Eden MD        melatonin tablet 6 mg  6 mg Oral Nightly PRN Kandy Eden MD        NORepinephrine bitartrate-NaCl 8 mg/250 mL (32 mcg/mL) infusion  0.05 mcg/kg/min Intravenous Continuous Pankaj Parsons MD 48.8 mL/hr at 05/10/22 0545 0.26 mcg/kg/min at 05/10/22 0545    pantoprazole injection 40 mg  40 mg Intravenous Daily W. Alden Andrea MD   40 mg at 05/09/22 1151    piperacillin-tazobactam (ZOSYN) 4.5 g in sodium chloride 0.9 % 100 mL IVPB (MB+)  4.5 g Intravenous Q8H Kandy Eden MD   Stopped at 05/10/22 0418    propofol (DIPRIVAN) 10 mg/mL infusion  0-50 mcg/kg/min Intravenous Continuous Kandy Eden MD 12 mL/hr at 05/10/22 0545 20 mcg/kg/min at 05/10/22 0545    sodium chloride 0.9% flush 10 mL  10 mL Intravenous PRN Kandy Eden MD        sodium chloride 0.9% flush 10 mL  10 mL Intravenous Q6H W. Alden Andrea MD   10 mL at 05/10/22 0534    And    sodium chloride 0.9% flush 10 mL  10 mL Intravenous PRN DERRICK Andrea MD           Objective:  Patient Vitals for the past 24 hrs:   BP Temp Temp src Pulse Resp SpO2   05/10/22 0600 -- 98.4 °F (36.9 °C) Bladder -- -- --   05/10/22 0500 -- 98.4 °F (36.9 °C) Bladder -- -- --   05/10/22 0400 -- 98.1 °F (36.7 °C) Bladder -- -- --   05/10/22 0330 (!) 94/58 -- -- 106 (!) 32 (!) 94 %   05/10/22 0315 (!) 92/57 -- -- 107 -- (!) 94 %   05/10/22 0300 (!) 95/57 97.9 °F (36.6 °C) Bladder 106 (!) 32 95 %   05/10/22 0245 (!) 94/58 -- -- 106 -- 95 %   05/10/22 0230 98/61 -- -- 109 -- 95 %   05/10/22 0215 (!) 98/56 -- -- 108 -- 96 %   05/10/22 0200 (!) 89/57 97.5 °F (36.4 °C) Bladder 108 -- (!) 94 %   05/10/22 0145 (!) 94/59 -- -- 108 (!) 32 95 %   05/10/22 0130 (!) 87/58 -- -- 110 (!) 32 (!) 92 %   05/10/22 0115 (!) 87/55 -- -- (!) 111 (!) 32 (!) 91 %   05/10/22 0100 (!) 85/55 98.4 °F (36.9 °C) Bladder (!) 113 (!) 31 (!) 88 %   05/10/22 0045 103/67 -- -- (!) 113 (!) 32 (!)  92 %   05/10/22 0030 106/65 -- -- (!) 114 (!) 32 (!) 92 %   05/10/22 0015 99/63 -- -- (!) 115 (!) 32 (!) 91 %   05/10/22 0000 110/64 98.4 °F (36.9 °C) Bladder (!) 114 (!) 32 (!) 92 %   05/09/22 2345 112/67 -- -- (!) 113 (!) 32 (!) 93 %   05/09/22 2330 105/66 -- -- (!) 115 (!) 32 (!) 91 %   05/09/22 2315 99/64 -- -- (!) 115 (!) 32 (!) 91 %   05/09/22 2300 103/64 98.4 °F (36.9 °C) Bladder (!) 115 (!) 32 (!) 92 %   05/09/22 2245 -- -- -- (!) 115 (!) 32 (!) 92 %   05/09/22 2230 -- -- -- (!) 116 (!) 32 (!) 92 %   05/09/22 2215 -- -- -- (!) 118 (!) 32 (!) 94 %   05/09/22 2200 112/66 98.2 °F (36.8 °C) Bladder (!) 117 (!) 32 (!) 93 %   05/09/22 2145 -- -- -- (!) 118 (!) 32 95 %   05/09/22 2130 -- -- -- (!) 115 (!) 32 (!) 94 %   05/09/22 2115 105/65 -- -- (!) 119 (!) 32 (!) 93 %   05/09/22 2100 112/67 98.2 °F (36.8 °C) Bladder (!) 121 (!) 32 (!) 93 %   05/09/22 2045 -- -- -- (!) 120 (!) 32 (!) 94 %   05/09/22 2030 -- -- -- (!) 120 (!) 32 (!) 94 %   05/09/22 2015 -- -- -- (!) 121 (!) 32 (!) 94 %   05/09/22 2000 121/70 98.2 °F (36.8 °C) Bladder (!) 122 (!) 32 (!) 94 %   05/09/22 1945 -- -- -- (!) 122 (!) 32 (!) 94 %   05/09/22 1930 -- -- -- (!) 121 (!) 32 (!) 93 %   05/09/22 1915 -- -- -- (!) 122 (!) 32 (!) 93 %   05/09/22 1900 120/74 98.2 °F (36.8 °C) Bladder (!) 121 (!) 32 (!) 94 %   05/09/22 1815 -- -- -- (!) 123 (!) 32 (!) 94 %   05/09/22 1801 124/75 -- -- -- -- --   05/09/22 1800 -- 97.9 °F (36.6 °C) Bladder (!) 125 (!) 32 (!) 94 %   05/09/22 1745 -- -- -- (!) 125 (!) 32 (!) 93 %   05/09/22 1730 -- -- -- (!) 125 (!) 32 (!) 93 %   05/09/22 1715 -- -- -- (!) 125 (!) 32 (!) 93 %   05/09/22 1702 110/66 -- -- -- -- --   05/09/22 1700 -- 97.7 °F (36.5 °C) Bladder (!) 124 (!) 43 (!) 94 %   05/09/22 1645 -- -- -- (!) 123 (!) 32 (!) 93 %   05/09/22 1630 -- -- -- 107 (!) 32 (!) 94 %   05/09/22 1615 -- -- -- (!) 124 (!) 32 (!) 94 %   05/09/22 1601 (!) 143/74 -- -- -- -- --   05/09/22 1600 -- 97.5 °F (36.4 °C) Bladder (!) 123 (!) 32  (!) 94 %   05/09/22 1545 -- -- -- (!) 120 (!) 32 (!) 94 %   05/09/22 1530 -- -- -- (!) 118 (!) 32 (!) 94 %   05/09/22 1503 -- -- -- 98 (!) 32 (!) 94 %   05/09/22 1500 117/62 97.7 °F (36.5 °C) Bladder 98 (!) 32 (!) 94 %   05/09/22 1445 -- -- -- 97 (!) 32 (!) 93 %   05/09/22 1444 -- -- -- -- (!) 32 --   05/09/22 1430 -- -- -- 96 (!) 32 (!) 93 %   05/09/22 1415 -- -- -- 97 (!) 32 (!) 92 %   05/09/22 1401 (!) 102/57 -- -- -- -- --   05/09/22 1400 -- 97.3 °F (36.3 °C) Bladder 110 (!) 32 (!) 92 %   05/09/22 1345 -- -- -- (!) 112 (!) 32 (!) 93 %   05/09/22 1330 -- -- -- (!) 115 (!) 32 (!) 92 %   05/09/22 1315 -- -- -- (!) 116 (!) 32 (!) 92 %   05/09/22 1301 105/66 -- -- -- -- --   05/09/22 1300 -- 97.9 °F (36.6 °C) Bladder (!) 117 (!) 32 (!) 93 %   05/09/22 1245 -- -- -- (!) 124 (!) 32 (!) 93 %   05/09/22 1230 -- -- -- (!) 125 (!) 32 (!) 93 %   05/09/22 1210 -- -- -- -- -- (!) 93 %   05/09/22 1203 -- -- -- (!) 128 (!) 32 (!) 92 %   05/09/22 1201 115/68 -- -- -- -- --   05/09/22 1200 -- 98.4 °F (36.9 °C) Bladder (!) 128 (!) 32 (!) 93 %   05/09/22 1145 -- -- -- (!) 129 (!) 1 (!) 93 %   05/09/22 1130 -- -- -- (!) 130 (!) 6 (!) 93 %   05/09/22 1115 (!) 128/109 -- -- (!) 136 (!) 4 (!) 93 %   05/09/22 1100 (!) 128/109 98.6 °F (37 °C) Bladder (!) 137 (!) 32 (!) 93 %   05/09/22 1058 -- -- -- -- (!) 32 --   05/09/22 1057 -- -- -- -- (!) 32 --   05/09/22 1056 -- -- -- (!) 137 (!) 7 (!) 94 %   05/09/22 1045 -- -- -- (!) 135 (!) 32 (!) 94 %   05/09/22 1030 -- -- -- (!) 132 (!) 32 (!) 93 %   05/09/22 1015 -- -- -- (!) 128 (!) 32 (!) 94 %   05/09/22 1001 110/67 -- -- -- -- --   05/09/22 1000 -- 97.5 °F (36.4 °C) Bladder (!) 124 (!) 32 95 %   05/09/22 0945 -- -- -- (!) 124 (!) 32 (!) 94 %   05/09/22 0930 -- -- -- (!) 122 (!) 32 (!) 93 %   05/09/22 0915 -- -- -- (!) 121 (!) 32 (!) 93 %   05/09/22 0900 119/72 97 °F (36.1 °C) Bladder (!) 121 (!) 32 (!) 93 %   05/09/22 0845 -- -- -- (!) 121 (!) 32 (!) 93 %   05/09/22 0830 -- -- -- (!) 121  (!) 32 (!) 92 %   05/09/22 0815 -- -- -- (!) 121 (!) 32 (!) 93 %   05/09/22 0800 119/71 96.8 °F (36 °C) Bladder (!) 121 (!) 32 (!) 93 %   05/09/22 0745 -- -- -- (!) 111 (!) 32 (!) 93 %   05/09/22 0730 -- -- -- (!) 120 (!) 32 (!) 94 %   05/09/22 0723 116/72 -- -- -- -- --   05/09/22 0715 -- -- -- (!) 119 (!) 32 (!) 93 %     Recent Results (from the past 24 hour(s))   POCT glucose    Collection Time: 05/09/22  7:10 AM   Result Value Ref Range    POCT Glucose 126 (H) 70 - 110 mg/dL   Magnesium    Collection Time: 05/09/22  7:12 AM   Result Value Ref Range    Magnesium Level 1.70 1.60 - 2.60 mg/dL   Comprehensive metabolic panel    Collection Time: 05/09/22  7:12 AM   Result Value Ref Range    Sodium Level 146 (H) 136 - 145 mmol/L    Potassium Level 3.5 3.5 - 5.1 mmol/L    Chloride 107 98 - 107 mmol/L    Carbon Dioxide 24 23 - 31 mmol/L    Glucose Level 119 (H) 82 - 115 mg/dL    Blood Urea Nitrogen 25.0 (H) 9.8 - 20.1 mg/dL    Creatinine 1.56 (H) 0.55 - 1.02 mg/dL    Calcium Level Total 7.4 (L) 8.4 - 10.2 mg/dL    Protein Total 5.1 (L) 5.8 - 7.6 gm/dL    Albumin Level 2.5 (L) 3.4 - 4.8 gm/dL    Globulin 2.6 2.4 - 3.5 gm/dL    Albumin/Globulin Ratio 1.0 (L) 1.1 - 2.0 ratio    Bilirubin Total 1.0 <=1.5 mg/dL    Bilirubin Direct 0.5 0.0 - 0.5 mg/dL    Bilirubin Indirect 0.50 0.00 - 0.80 mg/dL    Alkaline Phosphatase 74 40 - 150 unit/L    Alanine Aminotransferase 367 (H) 0 - 55 unit/L    Aspartate Aminotransferase 470 (H) 5 - 34 unit/L    Estimated GFR-Non  35 mls/min/1.73/m2   Lactic Acid, Plasma    Collection Time: 05/09/22  7:12 AM   Result Value Ref Range    Lactic Acid Level 6.5 (HH) 0.5 - 2.2 mmol/L   APTT    Collection Time: 05/09/22  7:12 AM   Result Value Ref Range    .5 (HH) 23.2 - 33.7 seconds   POCT ARTERIAL BLOOD GAS Blood Gas    Collection Time: 05/09/22  7:40 AM   Result Value Ref Range    PH ARTERIAL 7.420     PCO2 ARTERIAL 46     PO2 ARTERIAL 106     Sodium 145     Potassium, Bld 3.3      Ionized Calcium 0.94 mmol/L    HCO3, Arterial 29.8 (A) 18.0 - 23.0 MMOL/L    CO2 TOTAL 31.2     Base Excess, Arterial 4.6 (A) -2.0 - 2.0 mmol/L    % Saturation 98    POCT glucose    Collection Time: 05/09/22  8:09 AM   Result Value Ref Range    POCT Glucose 113 (H) 70 - 110 mg/dL   Phosphorus    Collection Time: 05/09/22  8:34 AM   Result Value Ref Range    Phosphorus Level 3.5 2.3 - 4.7 mg/dL   POCT glucose    Collection Time: 05/09/22  9:19 AM   Result Value Ref Range    POCT Glucose 99 70 - 110 mg/dL   POCT glucose    Collection Time: 05/09/22  9:52 AM   Result Value Ref Range    POCT Glucose 102 70 - 110 mg/dL   Lactic Acid, Plasma    Collection Time: 05/09/22 10:45 AM   Result Value Ref Range    Lactic Acid Level 5.2 (HH) 0.5 - 2.2 mmol/L   POCT glucose    Collection Time: 05/09/22 11:11 AM   Result Value Ref Range    POCT Glucose 96 70 - 110 mg/dL   POCT glucose    Collection Time: 05/09/22 11:49 AM   Result Value Ref Range    POCT Glucose 108 70 - 110 mg/dL   Phosphorus    Collection Time: 05/09/22 12:37 PM   Result Value Ref Range    Phosphorus Level 3.9 2.3 - 4.7 mg/dL   Lactic Acid, Plasma    Collection Time: 05/09/22 12:37 PM   Result Value Ref Range    Lactic Acid Level 5.4 (HH) 0.5 - 2.2 mmol/L   POCT glucose    Collection Time: 05/09/22 12:59 PM   Result Value Ref Range    POCT Glucose 113 (H) 70 - 110 mg/dL   POCT glucose    Collection Time: 05/09/22  2:11 PM   Result Value Ref Range    POCT Glucose 127 (H) 70 - 110 mg/dL   POCT glucose    Collection Time: 05/09/22  3:09 PM   Result Value Ref Range    POCT Glucose 122 (H) 70 - 110 mg/dL   POCT glucose    Collection Time: 05/09/22  4:05 PM   Result Value Ref Range    POCT Glucose 137 (H) 70 - 110 mg/dL   Phosphorus    Collection Time: 05/09/22  4:20 PM   Result Value Ref Range    Phosphorus Level 3.7 2.3 - 4.7 mg/dL   Lactic Acid, Plasma    Collection Time: 05/09/22  4:20 PM   Result Value Ref Range    Lactic Acid Level 6.8 (HH) 0.5 - 2.2 mmol/L    POCT glucose    Collection Time: 05/09/22  4:58 PM   Result Value Ref Range    POCT Glucose 151 (H) 70 - 110 mg/dL   POCT glucose    Collection Time: 05/09/22  6:00 PM   Result Value Ref Range    POCT Glucose 164 (H) 70 - 110 mg/dL   POCT glucose    Collection Time: 05/09/22  7:31 PM   Result Value Ref Range    POCT Glucose 206 (H) 70 - 110 mg/dL   Phosphorus    Collection Time: 05/09/22  7:34 PM   Result Value Ref Range    Phosphorus Level 3.8 2.3 - 4.7 mg/dL   Lactic Acid, Plasma    Collection Time: 05/09/22  7:34 PM   Result Value Ref Range    Lactic Acid Level 8.0 (HH) 0.5 - 2.2 mmol/L   POCT ARTERIAL BLOOD GAS Blood Gas, Lytes (NA,K,ICA,HCT)    Collection Time: 05/09/22  7:34 PM   Result Value Ref Range    HCO3, Arterial 22.5 18.0 - 23.0 MMOL/L   POCT ARTERIAL BLOOD GAS    Collection Time: 05/09/22  7:34 PM   Result Value Ref Range    POC PH 7.38 6.80 - 8.00    POC PCO2 38 0.0 - 150 mmHg    POC PO2 201 0.00 - 800 mmHg    POC HEMOGLOBIN 13.4 5.00 - 23.0 g/dL    POC SATURATED O2 99.7 %    POC O2Hb 97.4 %    POC COHb 1.5 %    POC MetHb 1.3 %    POC Potassium 4.9 0.20 - 20 mmol/l    POC Sodium 139 100 - 200 mmol/l    POC Ionized Calcium 0.91 0.10 - 5.0 mmol/l    Correct Temperature (PH) 7.38 6.80 - 8.00    Corrected Temperature (pCO2) 38 0.0 - 150 mmHg    Corrected Temperature (pO2) 201 0.00 - 800 mmHg    Collection Duration 22.5 3.00 - 60.0 mmol/l    Base Deficit -2.3 mmol/l    POC Temp 37.0 °C    Specimen source Arterial sample    POCT glucose    Collection Time: 05/09/22  8:08 PM   Result Value Ref Range    POCT Glucose 177 (H) 70 - 110 mg/dL   POCT glucose    Collection Time: 05/09/22  9:07 PM   Result Value Ref Range    POCT Glucose 203 (H) 70 - 110 mg/dL   POCT glucose    Collection Time: 05/09/22 11:27 PM   Result Value Ref Range    POCT Glucose 211 (H) 70 - 110 mg/dL   Phosphorus    Collection Time: 05/10/22 12:11 AM   Result Value Ref Range    Phosphorus Level 4.2 2.3 - 4.7 mg/dL   Lactic Acid, Plasma     Collection Time: 05/10/22 12:11 AM   Result Value Ref Range    Lactic Acid Level 8.5 (HH) 0.5 - 2.2 mmol/L   POCT glucose    Collection Time: 05/10/22 12:14 AM   Result Value Ref Range    POCT Glucose 230 (H) 70 - 110 mg/dL   POCT glucose    Collection Time: 05/10/22  1:01 AM   Result Value Ref Range    POCT Glucose 196 (H) 70 - 110 mg/dL   POCT glucose    Collection Time: 05/10/22  2:25 AM   Result Value Ref Range    POCT Glucose 195 (H) 70 - 110 mg/dL   POCT glucose    Collection Time: 05/10/22  3:36 AM   Result Value Ref Range    POCT Glucose 208 (H) 70 - 110 mg/dL   Comprehensive metabolic panel    Collection Time: 05/10/22  4:25 AM   Result Value Ref Range    Sodium Level 144 136 - 145 mmol/L    Potassium Level 4.4 3.5 - 5.1 mmol/L    Chloride 107 98 - 107 mmol/L    Carbon Dioxide 21 (L) 23 - 31 mmol/L    Glucose Level 194 (H) 82 - 115 mg/dL    Blood Urea Nitrogen 29.4 (H) 9.8 - 20.1 mg/dL    Creatinine 1.81 (H) 0.55 - 1.02 mg/dL    Calcium Level Total 7.0 (L) 8.4 - 10.2 mg/dL    Protein Total 4.7 (L) 5.8 - 7.6 gm/dL    Albumin Level 2.1 (L) 3.4 - 4.8 gm/dL    Globulin 2.6 2.4 - 3.5 gm/dL    Albumin/Globulin Ratio 0.8 (L) 1.1 - 2.0 ratio    Bilirubin Total 1.5 <=1.5 mg/dL    Bilirubin Direct 0.9 (H) 0.0 - 0.5 mg/dL    Bilirubin Indirect 0.60 0.00 - 0.80 mg/dL    Alkaline Phosphatase 62 40 - 150 unit/L    Alanine Aminotransferase 322 (H) 0 - 55 unit/L    Aspartate Aminotransferase 424 (H) 5 - 34 unit/L    Estimated GFR-Non  29 mls/min/1.73/m2   Magnesium    Collection Time: 05/10/22  4:25 AM   Result Value Ref Range    Magnesium Level 1.70 1.60 - 2.60 mg/dL   Phosphorus    Collection Time: 05/10/22  4:25 AM   Result Value Ref Range    Phosphorus Level 4.1 2.3 - 4.7 mg/dL   Lactic Acid, Plasma    Collection Time: 05/10/22  4:25 AM   Result Value Ref Range    Lactic Acid Level 7.0 (HH) 0.5 - 2.2 mmol/L   CBC with Differential    Collection Time: 05/10/22  4:25 AM   Result Value Ref Range    WBC  19.4 (H) 4.5 - 11.5 x10(3)/mcL    RBC 4.36 4.20 - 5.40 x10(6)/mcL    Hgb 12.2 12.0 - 16.0 gm/dL    Hct 37.6 37.0 - 47.0 %    MCV 86.2 80.0 - 94.0 fL    MCH 28.0 27.0 - 31.0 pg    MCHC 32.4 (L) 33.0 - 36.0 mg/dL    RDW 13.8 11.5 - 17.0 %    Platelet 106 (L) 130 - 400 x10(3)/mcL    MPV 14.1 (H) 9.4 - 12.4 fL    IG# 3.65 (H) 0 - 0.0155 x10(3)/mcL    IG% 18.8 (H) 0 - 0.43 %    NRBC% 0.0 %   POCT ARTERIAL BLOOD GAS Blood Gas    Collection Time: 05/10/22  5:05 AM   Result Value Ref Range    HCO3, Arterial 22.9 18.0 - 23.0 MMOL/L   POCT ARTERIAL BLOOD GAS    Collection Time: 05/10/22  5:11 AM   Result Value Ref Range    POC PH 7.45 6.80 - 8.00    POC PCO2 33 0.0 - 150 mmHg    POC PO2 256 0.00 - 800 mmHg    POC HEMOGLOBIN 12.4 5.00 - 23.0 g/dL    POC SATURATED O2 99.9 %    POC O2Hb 97.4 %    POC COHb 1.3 %    POC MetHb 1.0 %    POC Potassium 4.0 0.20 - 20 mmol/l    POC Sodium 140 100 - 200 mmol/l    POC Ionized Calcium 0.90 0.10 - 5.0 mmol/l    Correct Temperature (PH) 7.45 6.80 - 8.00    Corrected Temperature (pCO2) 33 0.0 - 150 mmHg    Corrected Temperature (pO2) 256 0.00 - 800 mmHg    Collection Duration 22.9 3.00 - 60.0 mmol/l    Base Deficit -0.5 mmol/l    POC Temp 37.0 °C    Specimen source Arterial sample      [unfilled]  Wt Readings from Last 3 Encounters:   05/08/22 100 kg (220 lb 7.4 oz)       Physical Exam:  General: no acute distress  Neck: No carotid bruit, no jugular venous distention.  Respiratory: Breath sounds are equal, symmetrical chest wall expansion. Breath sounds are coarse. Intubated.   Cardiovascular: tachy rate, regular rhythm. No murmur. No gallop. Generalized edema noted. Patient is ST on tele.  Integumentary: Clean, warm, dry, and intact.  Neurologic: sedated  Psychiatric: sedated        Assessment/Plan:    1. Cardiopulmonary arrest  -s/p CPR with ROSC x 2 on 5-8-22  -?anoxic brain injury  -continue vasopressor support as indicated  -therapeutic hypothermia initiated  -further plan per ICU  MD     2. NSTEMI  -EKG changes with RBBB on presentation to ER  -troponin 0.136 -> 42.34 -> 14.6  -not candidate for invasive evaluation given poor neurologic status     3. CMO  -echo with LVEF 40-45%  -unable to tolerate medical therapy given hypotension and RICO     4. RICO  -renal indices a bit worse today     5. DM  -on insulin gtt  -per primary medical team     6. Lactic acidosis, possible aspiration PNA  -per primary  -on antibiotic therapy                   JOHANA Valdes, FNP-C  [facility]

## 2022-05-10 NOTE — PLAN OF CARE
Educating spouse of progress with ventilator weaning and issues that have come up with weaning. Continuing to turn patient Q2 with heel boots maintained. Started tube feedings.

## 2022-05-11 NOTE — SIGNIFICANT EVENT
Called to pt bedside as patient's  wanted to know results of EEG.     EEG showed: Severely abnormal EEG due to findings consistent with a nonspecific bilateral brain dysfunction as seen with toxic, metabolic, infectious, and postanoxic encephalopathy.      states that he would like to withdraw care today.     Maynor Coronado MD  PGY-2, Internal Medicine

## 2022-05-11 NOTE — PROGRESS NOTES
[facility] - [facility]  Critical Care - Medicine  Progress Note    Patient Name: Millicent Arambula  MRN: [id number]  Admission Date: 5/8/2022  Hospital Length of Stay: 3 days  Code Status: DNR  Attending Provider: DERRICK Andrea MD  Primary Care Provider: Primary Doctor No   Principal Problem: Cardiopulmonary arrest    Subjective:     HPI: Millicent Arambula is a 72 y.o.   female who with a history of diabetes and hypertension presented with worsening shortness of breath about 1 hour prior to falling asleep. She lost consciousness on the way to the car and her  contacted EMS, upon arrival she was agonal breathing and bradycardic.  Upon arrival to the ER, she was in asystole/PEA. CODE BLUE was called and after about 15 minutes achieved ROSC.  Complicated by vomitus during code which was concerning for aspiration.  Upon stabilizing she was seen in CT scan where she coded again.  Labs on admission significant for blood sugars in 500s, acidosis with bicarb of 14, mildly elevated troponin of 0.87, , EKG Wide QRS with left axis deviation and RBBB, cards consulted by Er Doc no acute interventions recommended.  CT head negative for any stroke, CT PE with no evidence of PE but diffuse infiltrates bilaterally from apex to bases, also concern for extensive pulmonary vascular congestion.  Patient was further admitted to ICU mechanically ventilated status post cardiac arrest.    Interval History:  Patient remains in hypothermia protocol, currently in rewarming phase.  No longer on propofol with still no neurological response.  Currently on mechanical ventilation with settings as below. Did have increased O2 requirements yesterday afternoon, but has been able to be weaned back down.  Neurologically has pupil reflexes, no corneal, gag, cough, or withdrawal to pain.  Remains on insulin drip and Levophed 0.08 mcg/kg/min.         Objective:     Vital Signs (Most Recent):  Temp: 97.7 °F (36.5 °C)  (05/11/22 0000)  Pulse: (!) 113 (05/11/22 0045)  Resp: (!) 4 (05/11/22 0045)  BP: 114/65 (05/11/22 0045)  SpO2: 100 % (05/11/22 0045) Vital Signs (24h Range):  Temp:  [97 °F (36.1 °C)-99.7 °F (37.6 °C)] 97.7 °F (36.5 °C)  Pulse:  [107-124] 113  Resp:  [0-34] 4  SpO2:  [89 %-100 %] 100 %  BP: ()/(44-88) 114/65  Arterial Line BP: (0-147)/(0-76) 106/60     Weight: 100 kg (220 lb 7.4 oz)  Body mass index is 36.69 kg/m².     Physical exam:  General-elderly woman sedated on the ventilator  HEENT-endotracheal tube in the oropharynx.  Nasogastric tube in the left nares  Neck trachea in the midline  Chest-equal bilateral breath sounds noted.  Rhonchi are heard bilaterally anteriorly  Cardiovascular-regular tachycardia.  Distant heart sounds but no audible murmurs gallops or rubs heard.  Abdomen-obese bowel sounds present.  Soft nontender  Extremities-no peripheral cyanosis or clubbing.  2+ edema  Neuro-difficult to assess due to sedation      Intake/Output Summary (Last 24 hours) at 5/11/2022 0438  Last data filed at 5/10/2022 1800  Gross per 24 hour   Intake 3254 ml   Output 1090 ml   Net 2164 ml        Current Facility-Administered Medications:     dextrose 10 % infusion, , Intravenous, PRN, Kandy Eden MD    dextrose 10 % infusion, , Intravenous, PRN, Kandy Eden MD    dextrose 10% bolus 125 mL, 12.5 g, Intravenous, PRN, Kandy Eden MD    dextrose 10% bolus 250 mL, 25 g, Intravenous, PRN, Kandy Eden MD    doxycycline (VIBRAMYCIN) 100 mg in dextrose 5 % 250 mL IVPB, 100 mg, Intravenous, Q12H, Kandy Eden MD, Last Rate: 250 mL/hr at 05/10/22 1729, 100 mg at 05/10/22 1729    EPINEPHrine (ADRENALIN) 5 mg in dextrose 5 % 250 mL infusion, 0-2 mcg/kg/min, Intravenous, Continuous, Kandy Eden MD    furosemide injection 20 mg, 20 mg, Intravenous, Q12H, DERRICK Andrea MD, 20 mg at 05/10/22 1646    hydrALAZINE injection 10 mg, 10 mg, Intravenous, Q4H PRN, Pavana L  MD Meek    insulin regular bolus from bag/infusion 10 Units, 0.1 Units/kg, Intravenous, Once, Kandy dEen MD    insulin regular in 0.9 % NaCl 100 unit/100 mL (1 unit/mL) infusion, 2 Units/hr, Intravenous, Continuous, Kandy Eden MD, Last Rate: 0.6 mL/hr at 05/10/22 1826, 0.6 Units/hr at 05/10/22 1826    lactated ringers infusion, , Intravenous, Continuous, Kandy Eden MD, Stopped at 05/10/22 1500    levETIRAcetam (KEPPRA) 500 mg in sodium chloride 0.9 % 100 mL IVPB (MB+), 500 mg, Intravenous, Q12H, Kandy Eden MD, 500 mg at 05/10/22 2109    lorazepam injection 2 mg, 2 mg, Intravenous, Q10 Min PRN, Kandy Eden MD    melatonin tablet 6 mg, 6 mg, Oral, Nightly PRN, Kandy Eden MD    NORepinephrine bitartrate-NaCl 8 mg/250 mL (32 mcg/mL) infusion, 0.05 mcg/kg/min, Intravenous, Continuous, Pankaj Parsons MD, Last Rate: 18.8 mL/hr at 05/10/22 2150, 0.1 mcg/kg/min at 05/10/22 2150    pantoprazole injection 40 mg, 40 mg, Intravenous, Daily, DERRICK Andrea MD, 40 mg at 05/10/22 0822    piperacillin-tazobactam (ZOSYN) 4.5 g in sodium chloride 0.9 % 100 mL IVPB (MB+), 4.5 g, Intravenous, Q8H, Kandy Eden MD, Last Rate: 25 mL/hr at 05/11/22 0036, 4.5 g at 05/11/22 0036    propofol (DIPRIVAN) 10 mg/mL infusion, 0-50 mcg/kg/min, Intravenous, Continuous, Kandy Eden MD, Stopped at 05/10/22 1115    sodium chloride 0.9% flush 10 mL, 10 mL, Intravenous, PRN, Kandy Eden MD    Flushing PICC Protocol, , , Until Discontinued **AND** sodium chloride 0.9% flush 10 mL, 10 mL, Intravenous, Q6H, 10 mL at 05/11/22 0035 **AND** sodium chloride 0.9% flush 10 mL, 10 mL, Intravenous, PRN, DERRICK Andrea MD     Vents:  Vent Mode: A/C (05/11/22 0400)  Set Rate: 32 BPM (05/11/22 0400)  Vt Set: 420 mL (05/11/22 0400)  PEEP/CPAP: 13 cmH20 (05/11/22 0400)  Oxygen Concentration (%): 85 (05/10/22 2100)  Peak Airway Pressure: 32 cmH2O (05/11/22 0400)  Total  Ve: 11.8 mL (05/11/22 0400)  F/VT Ratio<105 (RSBI): (!) 90.65 (05/11/22 0000)    Lines/Drains/Airways     Peripherally Inserted Central Catheter Line  Duration           PICC Triple Lumen 05/08/22 0841 left basilic 2 days          Drain  Duration                NG/OG Tube 05/08/22 0110 Dolores sump 18 Fr. Right mouth 3 days         Urethral Catheter 05/08/22 0109 Temperature probe 16 Fr. 3 days         Rectal Tube 05/08/22 1200 2 days          Airway  Duration                Airway - Non-Surgical 05/08/22 0105 Endotracheal Tube 3 days          Arterial Line  Duration           Arterial Line 05/08/22 0530 Right Radial 2 days          Peripheral Intravenous Line  Duration                Peripheral IV - Single Lumen 05/08/22 0058 20 G Right Antecubital 3 days         Peripheral IV - Single Lumen 05/08/22 0211 20 G Left Wrist 3 days                Significant Labs:    Lab Results   Component Value Date    WBC 19.4 (H) 05/10/2022    HGB 12.2 05/10/2022    HCT 37.6 05/10/2022    MCV 86.2 05/10/2022     (L) 05/10/2022         BMP  Lab Results   Component Value Date     05/10/2022    K 4.4 05/10/2022    CO2 21 (L) 05/10/2022    BUN 29.4 (H) 05/10/2022    CREATININE 1.81 (H) 05/10/2022    CALCIUM 7.0 (L) 05/10/2022    EGFRNONAA 29 05/10/2022     ABG  Recent Labs   Lab 05/10/22  1507   PH 7.32*   PO2 63*   PCO2 47*     Significant Imaging:  No chest x-ray today    DVT Prophylaxis:  Heparin drip  GI prophylaxis:  Protonix    Assessment/Plan:     Cardiac arrest-likely prolonged at least 15 minutes prior to ROSC.  Probable anoxic brain injury as a result.  Acute respiratory failure-continues to require high-flow O2.  Acute renal insufficiency-likely secondary to ATN from hypoperfusion.      -Continue hypothermia protocol, currently in rewarming phase with plan to turn off protocol at 7:00 a.m.  -Will wean Ventilatory support as tolerated, patient doing better from a respiratory standpoint  -Currently on Levophed 0.08  mcg/kg/min, will wean as tolerated  -Continue neuro assesments.  Patient now off sedation with still no neurological response  -continue tube feeds for nutritional support  -Continue supportive measures, patient likely suffered anoxic brain injury. -continue ICU care the patient remains critically ill      Hussein Kemp MD  Internal Medicine, PGY-3  [facility] - [facility] ICU

## 2022-05-11 NOTE — NURSING
Pt's family asked to speak with MD regarding EEG results. Dr. Coronado spoke with family. Decision was made for withdrawal of care. Pastoral care on the way.

## 2022-05-11 NOTE — CONSULTS
"Inpatient consult to Palliative Care  Consult performed by: CLAUDETTE Hinkle  Consult ordered by: DERRICK Andrea MD        Patient Name: Millicent Arambula   MRN: [id number]   Admission Date: 2022   Hospital Length of Stay: 3   Attending Provider: DERRICK Andrea MD   Consulting Provider: Deepali WILKINS  Reason for Consult: Goals of Care  Primary Care Physician:  Primary Doctor No     Principal Problem: Cardiopulmonary arrest       Final diagnoses:  [R06.02] SOB (shortness of breath)  [I46.9] Cardiopulmonary arrest (Primary)  [E87.2] Acidosis  [J69.0] Aspiration pneumonitis  [R41.89] Unresponsiveness      Assessment/Plan:     I reviewed the patient and family's understanding of the seriousness of the illness and its expected prognosis. We discussed the patient's goals of care and treatment preferences.        Dr. Andrea spoke with family concerning patient's current condition and informed we are awaiting EEG results.   expressed that their son  a year ago from MI and he and patient had had several conversations about their medical wishes.  He reiterated that although it is a difficult decision, he knows his wife would not want to have her life prolonged by life support if she did not have good prognosis.       and family member informed that patient's daughter and granddaughter had a "cold" and were reluctant to visit, but may want to see patient if withdrawal is discussed.  Informed that visitation changes when end of life or withdrawal of life support is being discussed.  Family verbalized understanding.  Offered support and encouraged to call for any questions/concerns.  Updated staff RN.        Interval History:   Patient has completed hypothermia protocol and remains off sedation.  She has been unresponsive and obtunded with no spontaneous respirations over the ventilator.  EEG done earlier today with results pending.  I am continuing to follow for assistance with plan of care.  "         Active Ambulatory Problems     Diagnosis Date Noted    No Active Ambulatory Problems     Resolved Ambulatory Problems     Diagnosis Date Noted    No Resolved Ambulatory Problems     No Additional Past Medical History        No past surgical history on file.     Review of patient's allergies indicates:   Allergen Reactions    Adhesive tape-silicones      Other reaction(s): SKIN COMES OFF          Current Facility-Administered Medications:     dextrose 10 % infusion, , Intravenous, PRN, Kandy Eden MD    dextrose 10 % infusion, , Intravenous, PRN, Kandy Eden MD    dextrose 10% bolus 125 mL, 12.5 g, Intravenous, PRN, Kandy Eden MD    dextrose 10% bolus 250 mL, 25 g, Intravenous, PRN, Kandy Eden MD    doxycycline (VIBRAMYCIN) 100 mg in dextrose 5 % 250 mL IVPB, 100 mg, Intravenous, Q12H, Kandy Eden MD, Stopped at 05/11/22 0609    EPINEPHrine (ADRENALIN) 5 mg in dextrose 5 % 250 mL infusion, 0-2 mcg/kg/min, Intravenous, Continuous, Kandy Eden MD    furosemide injection 20 mg, 20 mg, Intravenous, Q12H, DERRICK Andrea MD, 20 mg at 05/11/22 0509    hydrALAZINE injection 10 mg, 10 mg, Intravenous, Q4H PRN, Kandy Eden MD    insulin regular bolus from bag/infusion 10 Units, 0.1 Units/kg, Intravenous, Once, Kandy Eden MD    insulin regular in 0.9 % NaCl 100 unit/100 mL (1 unit/mL) infusion, 2 Units/hr, Intravenous, Continuous, Kandy Eden MD, Last Rate: 0.6 mL/hr at 05/10/22 1826, 0.6 Units/hr at 05/10/22 1826    lactated ringers infusion, , Intravenous, Continuous, Kandy Eden MD, Stopped at 05/10/22 1500    levETIRAcetam (KEPPRA) 500 mg in sodium chloride 0.9 % 100 mL IVPB (MB+), 500 mg, Intravenous, Q12H, Kandy Eden MD, 500 mg at 05/11/22 0826    lorazepam injection 2 mg, 2 mg, Intravenous, Q10 Min PRN, Kandy Eden MD    melatonin tablet 6 mg, 6 mg, Oral, Nightly PRN, Kandy Eden MD     "NORepinephrine bitartrate-NaCl 8 mg/250 mL (32 mcg/mL) infusion, 0.05 mcg/kg/min, Intravenous, Continuous, Pankaj Parsons MD, Last Rate: 168.8 mL/hr at 05/11/22 0836, 0.9 mcg/kg/min at 05/11/22 0836    pantoprazole injection 40 mg, 40 mg, Intravenous, Daily, DERRICK Andrea MD, 40 mg at 05/11/22 0825    piperacillin-tazobactam (ZOSYN) 4.5 g in sodium chloride 0.9 % 100 mL IVPB (MB+), 4.5 g, Intravenous, Q8H, Kandy Eden MD, Last Rate: 25 mL/hr at 05/11/22 0825, 4.5 g at 05/11/22 0825    propofol (DIPRIVAN) 10 mg/mL infusion, 0-50 mcg/kg/min, Intravenous, Continuous, Kandy Eden MD, Stopped at 05/10/22 1115    sodium chloride 0.9% flush 10 mL, 10 mL, Intravenous, PRN, Kandy Eden MD    Flushing PICC Protocol, , , Until Discontinued **AND** sodium chloride 0.9% flush 10 mL, 10 mL, Intravenous, Q6H, 10 mL at 05/11/22 0825 **AND** sodium chloride 0.9% flush 10 mL, 10 mL, Intravenous, PRN, DERRICK Andrea MD     dextrose 10 % in water (D10W), dextrose 10 % in water (D10W), dextrose 10%, dextrose 10%, hydrALAZINE, lorazepam, melatonin, sodium chloride 0.9%, Flushing PICC Protocol **AND** sodium chloride 0.9% **AND** sodium chloride 0.9%     No family history on file.       Review of Systems   Unable to perform ROS: Patient unresponsive            Objective:   /67   Pulse 107   Temp 97.7 °F (36.5 °C) (Core Bladder)   Resp (!) 32   Ht 5' 5" (1.651 m)   Wt 100 kg (220 lb 7.4 oz)   LMP  (LMP Unknown)   SpO2 100%   Breastfeeding No   BMI 36.69 kg/m²      Physical Exam   Constitutional: She appears ill.   Eyes:   Pupils sluggish   Cardiovascular: Normal rate.   Pulmonary/Chest: She has rhonchi.   Abdominal: Soft.   Skin: Skin is warm. There is pallor.          Review of Symptoms  Review of Symptoms    Symptom Assessment (ESAS 0-10 Scale)  Pain:  0  Dyspnea:  0  Anxiety:  0  Nausea:  0  Depression:  0  Anorexia:  0  Fatigue:  0  Insomnia:  0  Restlessness:  0  Agitation:  0 "         Performance Status:  10    Spiritual:  F - Laney and Belief:  Scientologist      Advance Care Planning   Advance Directives:   Do Not Resuscitate Status: Yes            PAINAD: NA  Caregiver burden formerly assessed: yes      No results displayed because visit has over 200 results.               > 50% of 45 min of encounter was spent in chart review, face to face discussion of goals of care, symptom assessment, coordination of care and emotional support.    Deepali Dowd FNP, [facility]  Palliative Medicine  [facility] - Observation Unit

## 2022-05-11 NOTE — PLAN OF CARE
Problem: Adult Inpatient Plan of Care  Goal: Plan of Care Review  Outcome: Ongoing, Not Progressing  Goal: Patient-Specific Goal (Individualized)  Outcome: Ongoing, Not Progressing  Goal: Absence of Hospital-Acquired Illness or Injury  Outcome: Ongoing, Not Progressing  Intervention: Prevent Skin Injury  Flowsheets (Taken 5/10/2022 1935)  Body Position:   30 degrees   lower extremity elevated   neutral body alignment   neutral head position  Goal: Optimal Comfort and Wellbeing  Outcome: Ongoing, Not Progressing

## 2022-05-11 NOTE — PLAN OF CARE
BEAR WITH PALLIATIVE CARE AND I DISCUSSED CASE.FAMILY WILL DECIDE ABOUT WITHDRAWING CARE. WILL FOLLOW

## 2022-05-11 NOTE — PROGRESS NOTES
Cardiology Daily Progress Note    Patient Name: Millicent Arambula  Age: 72 y.o.  : 1950  MRN: [id number]  Admission Date: 2022      Subjective: No acute cardiac events overnight. Patient remains with poor neurological status. Nursing reports plans to withdraw care today.       Review of Systems - General ROS: unable to assess given patient clinical condition.       Health Status:  Review of patient's allergies indicates:   Allergen Reactions    Adhesive tape-silicones      Other reaction(s): SKIN COMES OFF       Current Facility-Administered Medications   Medication Dose Route Frequency Provider Last Rate Last Admin    dextrose 10 % infusion   Intravenous PRN Kandy Eden MD        dextrose 10 % infusion   Intravenous PRN Kandy Eden MD        dextrose 10% bolus 125 mL  12.5 g Intravenous PRN Kandy Eden MD        dextrose 10% bolus 250 mL  25 g Intravenous PRN Kandy Eden MD        doxycycline (VIBRAMYCIN) 100 mg in dextrose 5 % 250 mL IVPB  100 mg Intravenous Q12H Kandy Eden MD   Stopped at 22 0609    EPINEPHrine (ADRENALIN) 5 mg in dextrose 5 % 250 mL infusion  0-2 mcg/kg/min Intravenous Continuous Kandy Eden MD        furosemide injection 20 mg  20 mg Intravenous Q12H W. Alden Andrea MD   20 mg at 22 0509    hydrALAZINE injection 10 mg  10 mg Intravenous Q4H PRN Kandy Eden MD        insulin regular bolus from bag/infusion 10 Units  0.1 Units/kg Intravenous Once Kandy Eden MD        insulin regular in 0.9 % NaCl 100 unit/100 mL (1 unit/mL) infusion  2 Units/hr Intravenous Continuous Kandy Eden MD 0.6 mL/hr at 05/10/22 1826 0.6 Units/hr at 05/10/22 1826    lactated ringers infusion   Intravenous Continuous Kandy Eden MD   Stopped at 05/10/22 1500    levETIRAcetam (KEPPRA) 500 mg in sodium chloride 0.9 % 100 mL IVPB (MB+)  500 mg Intravenous Q12H Kandy Eden MD   500 mg at 22 0826     lorazepam injection 2 mg  2 mg Intravenous Q10 Min PRN Kandy Eden MD        melatonin tablet 6 mg  6 mg Oral Nightly PRN Kandy Eden MD        NORepinephrine bitartrate-NaCl 8 mg/250 mL (32 mcg/mL) infusion  0.05 mcg/kg/min Intravenous Continuous Pankaj Parsons .8 mL/hr at 05/11/22 0836 0.9 mcg/kg/min at 05/11/22 0836    pantoprazole injection 40 mg  40 mg Intravenous Daily W. Alden Andrea MD   40 mg at 05/11/22 0825    piperacillin-tazobactam (ZOSYN) 4.5 g in sodium chloride 0.9 % 100 mL IVPB (MB+)  4.5 g Intravenous Q8H Kandy Eden MD 25 mL/hr at 05/11/22 0825 4.5 g at 05/11/22 0825    propofol (DIPRIVAN) 10 mg/mL infusion  0-50 mcg/kg/min Intravenous Continuous Kandy Eden MD   Stopped at 05/10/22 1115    sodium chloride 0.9% flush 10 mL  10 mL Intravenous PRN Kandy Eden MD        sodium chloride 0.9% flush 10 mL  10 mL Intravenous Q6H W. Alden Andrea MD   10 mL at 05/11/22 0825    And    sodium chloride 0.9% flush 10 mL  10 mL Intravenous PRN DERRICK Andrea MD           Objective:  Patient Vitals for the past 24 hrs:   BP Temp Temp src Pulse Resp SpO2 Height Weight   05/11/22 0830 101/65 -- -- (!) 114 (!) 26 100 % -- --   05/11/22 0815 107/70 -- -- (!) 113 (!) 32 100 % -- --   05/11/22 0800 112/67 -- -- (!) 112 (!) 32 100 % -- --   05/11/22 0745 113/68 -- -- (!) 112 (!) 32 100 % -- --   05/11/22 0730 114/67 -- -- (!) 113 (!) 32 100 % -- --   05/11/22 0715 113/68 -- -- (!) 113 (!) 32 100 % -- --   05/11/22 0700 121/71 -- -- (!) 111 (!) 32 100 % -- --   05/11/22 0645 120/68 -- -- (!) 112 (!) 29 100 % -- --   05/11/22 0630 117/72 -- -- (!) 112 (!) 26 100 % -- --   05/11/22 0615 122/74 -- -- (!) 112 (!) 32 100 % -- --   05/11/22 0600 116/68 -- -- (!) 112 (!) 32 99 % -- --   05/11/22 0545 120/74 -- -- (!) 111 (!) 32 99 % -- --   05/11/22 0530 123/74 -- -- (!) 112 (!) 32 98 % -- --   05/11/22 0515 102/60 -- -- 110 (!) 32 98 % -- --   05/11/22 0500 (!)  96/59 -- -- 109 (!) 32 100 % -- --   05/11/22 0445 (!) 106/55 -- -- (!) 111 (!) 32 100 % -- --   05/11/22 0430 (!) 100/53 -- -- 109 (!) 32 100 % -- --   05/11/22 0415 (!) 94/54 -- -- 108 (!) 21 100 % -- --   05/11/22 0400 (!) 89/55 97.7 °F (36.5 °C) Bladder 108 (!) 28 100 % -- --   05/11/22 0345 (!) 87/55 -- -- 108 (!) 32 100 % -- --   05/11/22 0330 -- -- -- 109 (!) 32 100 % -- --   05/11/22 0315 -- -- -- 109 (!) 32 100 % -- --   05/11/22 0300 -- -- -- 110 (!) 32 100 % -- --   05/11/22 0245 -- -- -- 108 (!) 29 100 % -- --   05/11/22 0045 114/65 -- -- (!) 113 (!) 4 100 % -- --   05/11/22 0030 101/64 -- -- (!) 113 (!) 0 100 % -- --   05/11/22 0015 111/64 -- -- (!) 112 (!) 28 100 % -- --   05/11/22 0000 105/69 97.7 °F (36.5 °C) Bladder (!) 114 (!) 32 100 % -- --   05/10/22 2345 119/61 -- -- (!) 114 (!) 30 100 % -- --   05/10/22 2330 99/73 -- -- (!) 115 (!) 32 100 % -- --   05/10/22 2315 110/67 -- -- (!) 114 (!) 32 100 % -- --   05/10/22 2300 100/74 -- -- (!) 114 (!) 32 100 % -- --   05/10/22 2245 (!) 112/58 -- -- (!) 115 (!) 32 100 % -- --   05/10/22 2230 103/64 -- -- (!) 117 (!) 26 100 % -- --   05/10/22 2215 (!) 107/53 -- -- (!) 115 (!) 32 100 % -- --   05/10/22 2200 117/67 -- -- (!) 115 (!) 25 100 % -- --   05/10/22 2145 115/74 -- -- (!) 116 (!) 32 100 % -- --   05/10/22 2130 106/69 -- -- (!) 114 (!) 32 98 % -- --   05/10/22 2115 (!) 84/57 -- -- (!) 113 (!) 32 97 % -- --   05/10/22 2100 (!) 70/44 -- -- (!) 112 (!) 1 95 % -- --   05/10/22 2045 97/64 -- -- (!) 114 (!) 32 95 % -- --   05/10/22 2030 117/63 -- -- (!) 115 (!) 32 95 % -- --   05/10/22 2015 103/65 -- -- (!) 114 (!) 26 (!) 94 % -- --   05/10/22 2000 99/71 97.9 °F (36.6 °C) Oral (!) 114 (!) 32 (!) 94 % -- --   05/10/22 1945 112/69 -- -- (!) 113 (!) 34 (!) 94 % -- --   05/10/22 1930 107/65 -- -- (!) 113 (!) 32 (!) 94 % -- --   05/10/22 1915 112/79 -- -- (!) 113 (!) 32 (!) 94 % -- --   05/10/22 1900 (!) 105/58 -- -- (!) 114 (!) 32 (!) 90 % -- --   05/10/22 1815  104/72 -- -- (!) 113 (!) 32 (!) 94 % -- --   05/10/22 1800 103/66 97 °F (36.1 °C) Bladder (!) 113 (!) 32 (!) 94 % -- --   05/10/22 1745 97/67 -- -- 110 (!) 32 95 % -- --   05/10/22 1730 109/63 -- -- (!) 113 (!) 32 (!) 94 % -- --   05/10/22 1715 108/68 -- -- (!) 114 (!) 32 (!) 93 % -- --   05/10/22 1700 115/73 97 °F (36.1 °C) Bladder (!) 116 (!) 32 (!) 93 % -- --   05/10/22 1645 103/70 -- -- (!) 116 (!) 32 (!) 93 % -- --   05/10/22 1631 -- -- -- (!) 118 (!) 32 (!) 93 % -- --   05/10/22 1630 100/71 -- -- (!) 117 (!) 32 (!) 94 % -- --   05/10/22 1624 -- -- -- -- -- (!) 94 % -- --   05/10/22 1616 107/71 -- -- -- -- -- -- --   05/10/22 1615 -- -- -- (!) 117 (!) 32 (!) 92 % -- --   05/10/22 1602 113/70 -- -- -- -- -- -- --   05/10/22 1600 -- 97.7 °F (36.5 °C) Bladder (!) 119 (!) 32 (!) 93 % -- --   05/10/22 1545 115/70 -- -- (!) 121 (!) 32 (!) 92 % -- --   05/10/22 1530 111/70 -- -- (!) 121 (!) 32 (!) 93 % -- --   05/10/22 1516 111/72 -- -- -- -- -- -- --   05/10/22 1515 -- -- -- (!) 121 (!) 28 (!) 93 % -- --   05/10/22 1500 111/69 98.8 °F (37.1 °C) Bladder (!) 123 (!) 28 (!) 90 % -- --   05/10/22 1445 116/72 -- -- (!) 121 (!) 32 (!) 91 % -- --   05/10/22 1430 118/71 -- -- (!) 120 (!) 32 (!) 91 % -- --   05/10/22 1415 116/71 -- -- (!) 122 (!) 28 (!) 91 % -- --   05/10/22 1400 (!) 144/79 99.7 °F (37.6 °C) Bladder (!) 121 (!) 23 (!) 92 % -- --   05/10/22 1345 (!) 146/88 -- -- (!) 121 (!) 28 (!) 92 % -- --   05/10/22 1330 117/76 -- -- (!) 124 (!) 8 (!) 94 % -- --   05/10/22 1315 95/66 -- -- (!) 117 (!) 28 (!) 90 % -- --   05/10/22 1300 (!) 89/57 99.5 °F (37.5 °C) Bladder (!) 111 (!) 28 (!) 89 % -- --   05/10/22 1245 92/60 -- -- (!) 117 (!) 28 (!) 94 % -- --   05/10/22 1230 96/62 -- -- (!) 118 (!) 28 95 % -- --   05/10/22 1219 -- -- -- (!) 120 (!) 28 95 % -- --   05/10/22 1215 105/65 -- -- (!) 119 (!) 28 95 % -- --   05/10/22 1200 136/79 97.9 °F (36.6 °C) Bladder (!) 120 (!) 28 95 % -- --   05/10/22 1145 (!) 140/79 -- -- (!)  "115 (!) 28 96 % -- --   05/10/22 1130 (!) 143/83 -- -- (!) 113 (!) 28 97 % -- --   05/10/22 1115 131/73 -- -- (!) 111 (!) 28 97 % -- --   05/10/22 1100 131/73 97.9 °F (36.6 °C) Bladder (!) 111 (!) 28 97 % -- --   05/10/22 1045 130/71 -- -- 110 (!) 28 97 % -- --   05/10/22 1030 125/75 -- -- 109 (!) 28 96 % -- --   05/10/22 1025 -- -- -- -- -- -- 5' 5" (1.651 m) --   05/10/22 1021 -- -- -- -- -- -- 5' 5" (1.651 m) 100 kg (220 lb 7.4 oz)   05/10/22 1015 132/75 -- -- 110 (!) 26 99 % -- --   05/10/22 1000 132/75 97.7 °F (36.5 °C) Bladder 109 (!) 28 99 % -- --   05/10/22 0945 131/76 -- -- (!) 111 (!) 0 98 % -- --   05/10/22 0930 132/78 -- -- (!) 111 20 97 % -- --   05/10/22 0915 (!) 140/81 -- -- (!) 111 (!) 2 99 % -- --   05/10/22 0909 -- -- -- (!) 111 (!) 32 99 % -- --     Recent Results (from the past 24 hour(s))   Magnesium    Collection Time: 05/10/22  9:13 AM   Result Value Ref Range    Magnesium Level 1.50 (L) 1.60 - 2.60 mg/dL   Phosphorus    Collection Time: 05/10/22  9:13 AM   Result Value Ref Range    Phosphorus Level 3.7 2.3 - 4.7 mg/dL   Lactic Acid, Plasma    Collection Time: 05/10/22  9:13 AM   Result Value Ref Range    Lactic Acid Level 4.8 (HH) 0.5 - 2.2 mmol/L   POCT glucose    Collection Time: 05/10/22  9:23 AM   Result Value Ref Range    POCT Glucose 164 (H) 70 - 110 mg/dL   POCT glucose    Collection Time: 05/10/22 10:12 AM   Result Value Ref Range    POCT Glucose 157 (H) 70 - 110 mg/dL   POCT glucose    Collection Time: 05/10/22 11:10 AM   Result Value Ref Range    POCT Glucose 139 (H) 70 - 110 mg/dL   Phosphorus    Collection Time: 05/10/22 12:00 PM   Result Value Ref Range    Phosphorus Level 4.1 2.3 - 4.7 mg/dL   Lactic Acid, Plasma    Collection Time: 05/10/22 12:00 PM   Result Value Ref Range    Lactic Acid Level 3.9 (HH) 0.5 - 2.2 mmol/L   POCT glucose    Collection Time: 05/10/22 12:03 PM   Result Value Ref Range    POCT Glucose 98 70 - 110 mg/dL   POCT glucose    Collection Time: 05/10/22  " 1:07 PM   Result Value Ref Range    POCT Glucose 125 (H) 70 - 110 mg/dL   POCT ARTERIAL BLOOD GAS    Collection Time: 05/10/22  1:48 PM   Result Value Ref Range    POC PH 7.29 (A) 7.35 - 7.45    POC PCO2 46 (A) 35 - 45 mmHg    POC PO2 64 (A) 80 - 100 mmHg    POC HEMOGLOBIN 13.7 12.0 - 16.0 g/dL    POC SATURATED O2 89.3 %    POC O2Hb 91.7 (A) 94.0 - 97.0 %    POC COHb 2.8 %    POC MetHb 0.4 0.4 - 2 %    POC Potassium 4.0 3.5 - 5.0 mmol/l    POC Sodium 140 137 - 145 mmol/l    POC Ionized Calcium 0.94 (A) 1.1 - 1.2 mmol/l    Correct Temperature (PH) 7.29 (A) 7.35 - 7.45    Corrected Temperature (pCO2) 46 (A) 35 - 45 mmHg    Corrected Temperature (pO2) 64 (A) 80 - 100 mmHg    Collection Duration 22.1 mmol/l    Base Deficit -4.6 (A) -2.0 - 2.0 mmol/l    POC Temp 37.0 C    Specimen source Arterial sample    POCT ARTERIAL BLOOD GAS Blood Gas    Collection Time: 05/10/22  1:51 PM   Result Value Ref Range    POC PH      POC PCO2      POC PO2      POC Sodium      POC Potassium      POC Ionized Calcium      POC HEMOGLOBIN      POC O2Hb      POC COHb      POC MetHb      POC PCO2      Base Excess, Arterial      O2 Sat, Art      HCO3, Arterial 22.1 18.0 - 23.0 MMOL/L   POCT glucose    Collection Time: 05/10/22  2:06 PM   Result Value Ref Range    POCT Glucose 134 (H) 70 - 110 mg/dL   POCT ARTERIAL BLOOD GAS    Collection Time: 05/10/22  3:07 PM   Result Value Ref Range    POC PH 7.32 (A) 7.35 - 7.45    POC PCO2 47 (A) 35 - 45 mmHg    POC PO2 63 (A) 80 - 100 mmHg    POC HEMOGLOBIN 12.1 12.0 - 16.0 g/dL    POC SATURATED O2 89.7 %    POC O2Hb 90.8 (A) 94.0 - 97.0 %    POC COHb 1.7 %    POC MetHb 1.1 0.40 - 1.5 %    POC Potassium 3.8 3.5 - 5.0 mmol/l    POC Sodium 141 137 - 145 mmol/l    POC Ionized Calcium 0.94 (A) 1.1 - 1.2 mmol/l    Correct Temperature (PH) 7.32 (A) 7.35 - 7.45    Corrected Temperature (pCO2) 47 (A) 35 - 45 mmHg    Corrected Temperature (pO2) 63 (A) 80 - 100 mmHg    Collection Duration 24.2 mmol/l    Base Deficit  -2.2 (A) -2.0 - 2.0 mmol/l    POC Temp 37.0 C    Specimen source Arterial sample    POCT ARTERIAL BLOOD GAS Blood Gas    Collection Time: 05/10/22  3:09 PM   Result Value Ref Range    POC PH      POC PCO2      POC PO2      POC Sodium      POC Potassium      POC Ionized Calcium      POC HEMOGLOBIN      POC O2Hb      POC COHb      POC MetHb      POC PCO2      Base Excess, Arterial      O2 Sat, Art      HCO3, Arterial 24.2 (A) 18.0 - 23.0 MMOL/L   POCT glucose    Collection Time: 05/10/22  3:09 PM   Result Value Ref Range    POCT Glucose 125 (H) 70 - 110 mg/dL   Lactic Acid, Plasma    Collection Time: 05/10/22  4:04 PM   Result Value Ref Range    Lactic Acid Level 3.6 (HH) 0.5 - 2.2 mmol/L   Phosphorus    Collection Time: 05/10/22  4:05 PM   Result Value Ref Range    Phosphorus Level 5.0 (H) 2.3 - 4.7 mg/dL   POCT glucose    Collection Time: 05/10/22  4:07 PM   Result Value Ref Range    POCT Glucose 132 (H) 70 - 110 mg/dL   POCT glucose    Collection Time: 05/10/22  5:25 PM   Result Value Ref Range    POCT Glucose 124 (H) 70 - 110 mg/dL   POCT glucose    Collection Time: 05/10/22  6:17 PM   Result Value Ref Range    POCT Glucose 170 (H) 70 - 110 mg/dL   POCT glucose    Collection Time: 05/10/22  7:14 PM   Result Value Ref Range    POCT Glucose 168 (H) 70 - 110 mg/dL   Phosphorus    Collection Time: 05/10/22  8:06 PM   Result Value Ref Range    Phosphorus Level 5.3 (H) 2.3 - 4.7 mg/dL   Lactic Acid, Plasma    Collection Time: 05/10/22  8:06 PM   Result Value Ref Range    Lactic Acid Level 4.1 (HH) 0.5 - 2.2 mmol/L   POCT glucose    Collection Time: 05/10/22 11:07 PM   Result Value Ref Range    POCT Glucose 147 (H) 70 - 110 mg/dL   Phosphorus    Collection Time: 05/11/22 12:45 AM   Result Value Ref Range    Phosphorus Level 5.2 (H) 2.3 - 4.7 mg/dL   Lactic Acid, Plasma    Collection Time: 05/11/22 12:45 AM   Result Value Ref Range    Lactic Acid Level 3.6 (HH) 0.5 - 2.2 mmol/L   POCT glucose    Collection Time: 05/11/22   1:06 AM   Result Value Ref Range    POCT Glucose 150 (H) 70 - 110 mg/dL   POCT glucose    Collection Time: 05/11/22  2:03 AM   Result Value Ref Range    POCT Glucose 149 (H) 70 - 110 mg/dL   POCT glucose    Collection Time: 05/11/22  7:57 AM   Result Value Ref Range    POCT Glucose 192 (H) 70 - 110 mg/dL   POCT glucose    Collection Time: 05/11/22  9:02 AM   Result Value Ref Range    POCT Glucose 210 (H) 70 - 110 mg/dL     [unfilled]  Wt Readings from Last 3 Encounters:   05/10/22 100 kg (220 lb 7.4 oz)       Physical Exam:  General: no acute distress  Neck: No carotid bruit, no jugular venous distention.  Respiratory: Breath sounds are equal, symmetrical chest wall expansion. Breath sounds are coarse.  Cardiovascular: tachy rate, regular rhythm. No murmur. No gallop. No edema noted. Patient is ST on tele.  Integumentary: Clean, warm, dry, and intact.  Neurologic: poor neurological function noted   Psychiatric: unable to assess given patient clinical condition        Assessment/Plan:     1. Cardiopulmonary arrest  -s/p CPR with ROSC x 2 on 5-8-22  -?anoxic brain injury -- very likely -- nursing reports plans to withdraw care today  -continue vasopressor support as indicated  -therapeutic hypothermia initiated and now re-warming  -further plan per ICU MD     2. NSTEMI  -EKG changes with RBBB on presentation to ER  -troponin 0.136 -> 42.34 -> 14.6  -not candidate for invasive evaluation given poor neurologic status     3. CMO  -echo with LVEF 40-45%  -unable to tolerate medical therapy given hypotension and RICO     4. RICO  -no labs today    5. DM  -on insulin gtt  -per primary medical team     6. Lactic acidosis, possible aspiration PNA  -per primary  -on antibiotic therapy              Elizabeth Melo, APRN, FNP-C  [facility]

## 2022-05-11 NOTE — PROCEDURES
EEG REPORT         DATE OF THE STUDY:    05/11/2022      REFERRING PROVIDER:   Dr Alden Andrea      REASON FOR THE STUDY:    Postanoxic encephalopathy      CONDITION OF THE RECORDING:    This is an 19 channel EEG utilizing the 10-20 International System for electrode placement including (18) cephalic and (1) EKG and standard montages.  All impedance were verified and noted to be within standards.  The recording was done for a period of 26 minutes.  The patient is orally intubated and unresponsive to pain stimulation.  He has been off sedation since yesterday.      DESCRIPTION:    The EEG in the comatose state is characterized by low-voltage fast activity rhythm with intermittent medium amplitude centrotemporal 4-6 hz rhythmical waves.  At times, this activity is followed by 1-2 second duration generalized voltage suppression.  No reactivity to painful stimulation.  Epileptiform spikes were not recorded.        IMPRESSION:   Severely abnormal EEG due to findings consistent with a nonspecific bilateral brain dysfunction as seen with toxic, metabolic, infectious, and postanoxic encephalopathy.                  Bertin Greene MD

## 2022-05-12 NOTE — DISCHARGE SUMMARY
Death Note    Time of Death: 7:10pm on 5/11/2022    Cause of Death: Cardiopulmonary arrest with multi-organ dysfunction      Physical Exam:   Gen: Px unresponsive  Eyes: Pupils fixed and dilated non-reactive to light  Heart: no heart sounds upon 1 minute of auscultation in 4 points  Lungs: no lung sounds upon 1 minute of auscultation  Extremities: No palpable pulses, carotid and brachial  CNS: no corneal reflex    Family withdrew care.    Maynor Coronado MD  PGY-2, Internal Medicine

## 2022-05-13 LAB
BACTERIA BLD CULT: NORMAL
BACTERIA BLD CULT: NORMAL

## 2022-05-17 LAB
CORRECTED TEMPERATURE (PCO2): 33 MMHG (ref 0–150)
CORRECTED TEMPERATURE (PCO2): 38 MMHG (ref 0–150)
CORRECTED TEMPERATURE (PCO2): 46 MMHG (ref 35–45)
CORRECTED TEMPERATURE (PCO2): 47 MMHG (ref 35–45)
CORRECTED TEMPERATURE (PH): 7.29 (ref 7.35–7.45)
CORRECTED TEMPERATURE (PH): 7.32 (ref 7.35–7.45)
CORRECTED TEMPERATURE (PH): 7.38 (ref 6.8–8)
CORRECTED TEMPERATURE (PH): 7.45 (ref 6.8–8)
CORRECTED TEMPERATURE (PO2): 201 MMHG (ref 0–800)
CORRECTED TEMPERATURE (PO2): 256 MMHG (ref 0–800)
CORRECTED TEMPERATURE (PO2): 63 MMHG (ref 80–100)
CORRECTED TEMPERATURE (PO2): 64 MMHG (ref 80–100)
HCO3 UR-SCNC: 22.1 MMOL/L
HCO3 UR-SCNC: 22.5 MMOL/L (ref 3–60)
HCO3 UR-SCNC: 22.9 MMOL/L (ref 3–60)
HCO3 UR-SCNC: 24.2 MMOL/L
HGB BLD-MCNC: 12.1 G/DL (ref 12–16)
HGB BLD-MCNC: 12.4 G/DL (ref 5–23)
HGB BLD-MCNC: 13.4 G/DL (ref 5–23)
HGB BLD-MCNC: 13.7 G/DL (ref 12–16)
Lab: 22.1 MMOL/L
Lab: 22.5 MMOL/L (ref 3–60)
Lab: 22.9 MMOL/L (ref 3–60)
Lab: 24.2 MMOL/L
PCO2 BLDA: 33 MMHG (ref 0–150)
PCO2 BLDA: 38 MMHG (ref 0–150)
PCO2 BLDA: 46 MMHG (ref 35–45)
PCO2 BLDA: 47 MMHG (ref 35–45)
PH SMN: 7.29 [PH] (ref 7.35–7.45)
PH SMN: 7.32 [PH] (ref 7.35–7.45)
PH SMN: 7.38 [PH] (ref 6.8–8)
PH SMN: 7.45 [PH] (ref 6.8–8)
PO2 BLDA: 201 MMHG (ref 0–800)
PO2 BLDA: 256 MMHG (ref 0–800)
PO2 BLDA: 63 MMHG (ref 80–100)
PO2 BLDA: 64 MMHG (ref 80–100)
POC BASE DEFICIT: -0.5 MMOL/L
POC BASE DEFICIT: -2.2 MMOL/L (ref -2–2)
POC BASE DEFICIT: -2.3 MMOL/L
POC BASE DEFICIT: -4.6 MMOL/L (ref -2–2)
POC COHB: 1.3 %
POC COHB: 1.5 %
POC COHB: 1.7 %
POC COHB: 2.8 %
POC IONIZED CALCIUM: 0.9 MMOL/L (ref 0.1–5)
POC IONIZED CALCIUM: 0.91 MMOL/L (ref 0.1–5)
POC IONIZED CALCIUM: 0.94 MMOL/L (ref 1.1–1.2)
POC IONIZED CALCIUM: 0.94 MMOL/L (ref 1.1–1.2)
POC METHB: 0.4 % (ref 0.4–2)
POC METHB: 1 %
POC METHB: 1.1 % (ref 0.4–1.5)
POC METHB: 1.3 %
POC O2HB: 90.8 % (ref 94–97)
POC O2HB: 91.7 % (ref 94–97)
POC O2HB: 97.4 %
POC O2HB: 97.4 %
POC SATURATED O2: 89.3 %
POC SATURATED O2: 89.7 %
POC SATURATED O2: 99.7 %
POC SATURATED O2: 99.9 %
POC TEMPERATURE: 37 C
POC TEMPERATURE: 37 C
POC TEMPERATURE: 37 °C
POC TEMPERATURE: 37 °C
POTASSIUM BLD-SCNC: 3.8 MMOL/L (ref 3.5–5)
POTASSIUM BLD-SCNC: 4 MMOL/L (ref 0.2–20)
POTASSIUM BLD-SCNC: 4 MMOL/L (ref 3.5–5)
POTASSIUM BLD-SCNC: 4.9 MMOL/L (ref 0.2–20)
SODIUM BLD-SCNC: 139 MMOL/L (ref 100–200)
SODIUM BLD-SCNC: 140 MMOL/L (ref 100–200)
SODIUM BLD-SCNC: 140 MMOL/L (ref 137–145)
SODIUM BLD-SCNC: 141 MMOL/L (ref 137–145)
SPECIMEN SOURCE: ABNORMAL
SPECIMEN SOURCE: ABNORMAL
SPECIMEN SOURCE: NORMAL
SPECIMEN SOURCE: NORMAL

## 2022-05-19 NOTE — PHYSICIAN QUERY
PT Name: Millicent Arambula  MR #: [id number]     DOCUMENTATION CLARIFICATION     CDS/: BIANCA Garnett, RN, CCDS               Contact information: [email or web address]  This form is a permanent document in the medical record.     Query Date: May 19, 2022    By submitting this query, we are merely seeking further clarification of documentation.  Please utilize your independent clinical judgment when addressing the question(s) below.    The Medical Record contains the following   Indicators Supporting Clinical Findings Location in Medical Record   X Heart Failure documented Diffuse pulmonary infiltrate concerning for aspiration pneumonia/cap in combination with pulmonary vascular congestion secondary to underlying CHF   H & P: Dr. Andrea 5/8   X .5   Lab: 5/8   X EF/Echo The estimated ejection fraction is 40-45%.  Mildly decreased systolic function.  Grade I left ventricular diastolic dysfunction   Echo: 5/8   X Radiology findings There is extensive diffuse bilateral ground glass opacity with large areas ofconsolidation with some mild dependent predominance. Interlobular septal thickening with groundglass is seen involving the upper lobes. These findings are consistent with pulmonary edema with possible elements of aspiration not excluded   CT chest: 5/8   X Subjective/Objective Respiratory Conditions mildly uncomfortable  earlier to night before falling asleep, 1 hour after falling asleep she woke up with acute onset SOB. H & P: Dr. Andrea 5/8   X Recent/Current MI Cardiopulmonary arrest x 2 S/p mech ventilated and achieved ROSC  NSTEMI H & P: Dr. Andrea 5/8    Heart Transplant, LVAD      Edema, JVD      Ascites     X Diuretics/Meds furosemide injection 20 mg  Dose: 20 mg  Freq: Every 12 hours (non-standard times) Route: IV  Start: 05/10/22 1645 End: 05/12/22 0540 MAR    Other Treatment      Other       Heart failure is a clinical diagnosis which includes symptomatic fluid retention, elevated intracardiac  pressures, and/or the inability of the heart to deliver adequate blood flow.     Heart Failure with reduced Ejection Fraction (HFrEF) or Systolic Heart Failure (loses ability to contract normally, EF is <40%)     Heart Failure with preserved Ejection Fraction (HFpEF) or Diastolic Heart Failure (stiff ventricles, does not relax properly, EF is >50%)      Heart Failure with Combined Systolic and Diastolic Failure (stiff ventricles, does not relax properly and EF is <50%)     Mid-range or mildly reduced ejection fraction (HFmrEF) is classified as systolic heart failure.   Common clues to acute exacerbation:  Rapidly progressive symptoms (w/in 2 weeks of presentation), using IV diuretics, using supplemental O2, pulmonary edema on Xray, new or worsening pleural effusion, +JVD or other signs of volume overload, MI w/in 4 weeks, and/or BNP >500  The clinical guidelines noted are only system guidelines, and do not replace the providers clinical judgment.    Provider, please specify type/acuity of CHF associated with the above clinical findings.    [   ]  Acute Systolic Heart Failure (HFrEF or HFmrEF) - new diagnosis   [   ]  Acute on Chronic Systolic Heart Failure (HFrEF or HFmrEF) - worsening of CHF signs/symptoms in preexisting CHF   [   ]  Acute Diastolic Heart Failure (HFpEF) - new diagnosis   [   ]  Acute on Chronic Diastolic Heart Failure (HFpEF) - worsening of CHF signs/symptoms in preexisting CHF   [   ]  Acute Combined Systolic and Diastolic Heart Failure - new diagnosis   [   ]  Acute on Chronic Combined Systolic and Diastolic Heart Failure - worsening of CHF signs/symptoms in preexisting CHF   [   ]  Heart Failure Ruled Out   [   ]  Other (please specify): ___________________________________   [  ]  Clinically Undetermined       Present on admission (POA) status:  [   ]  Yes (Y)   [   ]  No (N)   [   ]  Documentation insufficient to determine if condition is POA (U)   [  ]  Clinically Undetermined (W)      Please document in your progress notes daily for the duration of treatment until resolved and include in your discharge summary.    References:  American Heart Association editorial staff. (2017, May). Ejection Fraction Heart Failure Measurement. American Heart Association. https://www.heart.org/en/health-topics/heart-failure/diagnosing-heart-failure/ejection-fraction-heart-failure-measurement#:~:text=Ejection%20fraction%20(EF)%20is%20a,pushed%20out%20with%20each%20heartbeat  JON Gamez (2020, December 15). Heart failure with preserved ejection fraction: Clinical manifestations and diagnosis. [employer]. https://www.[employer].com/contents/heart-failure-with-preserved-ejection-fraction-clinical-manifestations-and-diagnosis.  ICD-10-CM/PCS Coding Clinic Third Quarter ICD-10, Effective with discharges: September 8, 2020 [facility] [employer] § Heart failure with mid-range or mildly reduced ejection fraction (2020).  Form No. [id number]

## 2022-05-19 NOTE — PHYSICIAN QUERY
PT Name: Millicent Arambula  MR #: [id number]     DOCUMENTATION CLARIFICATION     CDS/: BIANCA Garnett, RN, CCDS             Contact information: [email or web address]  This form is a permanent document in the medical record.     Query Date: May 19, 2022    By submitting this query, we are merely seeking further clarification of documentation.  Please utilize your independent clinical judgment when addressing the question(s) below.    The Medical Record contains the following   Indicators Supporting Clinical Findings Location in Medical Record   X Heart Failure documented Diffuse pulmonary infiltrate concerning for aspiration pneumonia/cap in combination with pulmonary vascular congestion secondary to underlying CHF   H & P: Dr. Andrea 5/8   X .5   Lab: 5/8   X EF/Echo The estimated ejection fraction is 40-45%.  Mildly decreased systolic function.  Grade I left ventricular diastolic dysfunction   Echo: 5/8   X Radiology findings There is extensive diffuse bilateral ground glass opacity with large areas ofconsolidation with some mild dependent predominance. Interlobular septal thickening with groundglass is seen involving the upper lobes. These findings are consistent with pulmonary edema with possible elements of aspiration not excluded   CT chest: 5/8   X Subjective/Objective Respiratory Conditions mildly uncomfortable  earlier to night before falling asleep, 1 hour after falling asleep she woke up with acute onset SOB.   H & P: Dr. Andrea 5/8   X Recent/Current MI Cardiopulmonary arrest x 2 S/p mech ventilated and achieved ROSC  NSTEMI   H & P: Dr. Andrea 5/8    Heart Transplant, LVAD      Edema, JVD      Ascites     X Diuretics/Meds furosemide injection 20 mg  Dose: 20 mg  Freq: Every 12 hours (non-standard times) Route: IV  Start: 05/10/22 1645 End: 05/12/22 0540 MAR    Other Treatment      Other       Heart failure is a clinical diagnosis which includes symptomatic fluid retention, elevated intracardiac  pressures, and/or the inability of the heart to deliver adequate blood flow.    Heart Failure with reduced Ejection Fraction (HFrEF) or Systolic Heart Failure (loses ability to contract normally, EF is <40%)    Heart Failure with preserved Ejection Fraction (HFpEF) or Diastolic Heart Failure (stiff ventricles, does not relax properly, EF is >50%)     Heart Failure with Combined Systolic and Diastolic Failure (stiff ventricles, does not relax properly and EF is <50%)    Mid-range or mildly reduced ejection fraction (HFmrEF) is classified as systolic heart failure.   Common clues to acute exacerbation:  Rapidly progressive symptoms (w/in 2 weeks of presentation), using IV diuretics, using supplemental O2, pulmonary edema on Xray, new or worsening pleural effusion, +JVD or other signs of volume overload, MI w/in 4 weeks, and/or BNP >500  The clinical guidelines noted are only system guidelines, and do not replace the providers clinical judgment.    Provider, please specify type/acuity of CHF associated with the above clinical findings.    [   ]  Acute Systolic Heart Failure (HFrEF or HFmrEF) - new diagnosis   [  x ]  Acute on Chronic Systolic Heart Failure (HFrEF or HFmrEF) - worsening of CHF signs/symptoms in preexisting CHF   [   ]  Acute Diastolic Heart Failure (HFpEF) - new diagnosis   [   ]  Acute on Chronic Diastolic Heart Failure (HFpEF) - worsening of CHF signs/symptoms in preexisting CHF   [   ]  Acute Combined Systolic and Diastolic Heart Failure - new diagnosis   [   ]  Acute on Chronic Combined Systolic and Diastolic Heart Failure - worsening of CHF signs/symptoms in preexisting CHF   [   ]  Heart Failure Ruled Out   [   ]  Other (please specify): ___________________________________   [  ]  Clinically Undetermined     Please document in your progress notes daily for the duration of treatment until resolved and include in your discharge summary.    References:  American Heart Association editorial staff.  (2017, May). Ejection Fraction Heart Failure Measurement. American Heart Association. https://www.heart.org/en/health-topics/heart-failure/diagnosing-heart-failure/ejection-fraction-heart-failure-measurement#:~:text=Ejection%20fraction%20(EF)%20is%20a,pushed%20out%20with%20each%20heartbeat  JON Gamez (2020, December 15). Heart failure with preserved ejection fraction: Clinical manifestations and diagnosis. Independent IP. https://www.[employer]/contents/heart-failure-with-preserved-ejection-fraction-clinical-manifestations-and-diagnosis.  ICD-10-CM/PCS Coding Clinic Third Quarter ICD-10, Effective with discharges: September 8, 2020 [facility] § Heart failure with mid-range or mildly reduced ejection fraction (2020).  Form No. [id number]

## 2022-05-19 NOTE — PHYSICIAN QUERY
PT Name: Millicent Arambula  MR #: [id number]     DOCUMENTATION CLARIFICATION     CDS/: BIANCA Garnett, RN, CCDS               Contact information: [email or web address]  This form is a permanent document in the medical record.     Query Date: May 19, 2022    By submitting this query, we are merely seeking further clarification of documentation to reflect the severity of illness of your patient. Please utilize your independent clinical judgment when addressing the question(s) below.    The medical record reflects the following:     Indicators   Supporting Clinical Findings Location in Medical Record   X Diabetes uncontrolled documented history of diabetes and hypertension   History of diabetes mellitus, not insulin dependent however blood sugars on admission severely uncontrolled and high 500s, started on insulin drip     DM II   H & P: Dr. Andrea 5/8           Cards Consult: Dr. Ballard 5/8   X Lab Value(s), POCT glucose value(s) Labs on admission significant for blood sugars in 500s, acidosis with bicarb of 14     Est ave glucose: 544, 780, 800  POCT glucose: 512, 226 H & P: Dr. Andrea 5/8     Lab: 5/6  Lab: 5/8, 5/10    Beta-OHxy Butyric Acid levels      Serum Osmolarity      pH      Anion gap/ Bicarb levels     X Treatment/Medication on insulin gtt     insulin regular in 0.9 % NaCl 100 unit/100 mL (1 unit/mL) infusion  Rate: 2 mL/hr Dose: 2 Units/hr  Freq: Continuous Route: IV  Start: 05/08/22 0530 End: 05/12/22 0540 Cards PN: Dr. Baker 5/9     MAR    Other       Provider, please specify the meaning of the term uncontrolled:    [   ] Diabetes mellitus Type 2 with hyperglycemia   [   ] Other diabetes complication (please specify): ____________   [   ]  Clinically Undetermined       Please document in your progress notes daily for the duration of treatment until resolved, and include in your discharge summary.

## 2022-05-19 NOTE — PHYSICIAN QUERY
PT Name: Millicent Arambula  MR #: [id number]     DOCUMENTATION CLARIFICATION     CDS/: BIANCA Garnett, RN, CCDS               Contact information: [email or web address]  This form is a permanent document in the medical record.     Query Date: May 19, 2022    By submitting this query, we are merely seeking further clarification of documentation to reflect the severity of illness of your patient. Please utilize your independent clinical judgment when addressing the question(s) below.    The medical record reflects the following:     Indicators   Supporting Clinical Findings Location in Medical Record   X Diabetes uncontrolled documented history of diabetes and hypertension   History of diabetes mellitus, not insulin dependent however blood sugars on admission severely uncontrolled and high 500s, started on insulin drip    DM II   H & P: Dr. Andrea 5/8        Cards Consult: Dr. Ballard 5/8   X Lab Value(s), POCT glucose value(s) Labs on admission significant for blood sugars in 500s, acidosis with bicarb of 14    Est ave glucose: 544, 780, 800  POCT glucose: 512, 226 H & P: Dr. Andrea 5/8    Lab: 5/6  Lab: 5/8, 5/10      Beta-OHxy Butyric Acid levels      Serum Osmolarity      pH      Anion gap/ Bicarb levels     X Treatment/Medication on insulin gtt    insulin regular in 0.9 % NaCl 100 unit/100 mL (1 unit/mL) infusion  Rate: 2 mL/hr Dose: 2 Units/hr  Freq: Continuous Route: IV  Start: 05/08/22 0530 End: 05/12/22 0540   Cards PN: Dr. Baker 5/9    MAR    Other       Provider, please specify the meaning of the term uncontrolled:    [   ] Diabetes mellitus Type 2 with hyperglycemia   [   ] Other diabetes complication (please specify): ____________   [   ]  Clinically Undetermined       Please document in your progress notes daily for the duration of treatment until resolved, and include in your discharge summary.

## 2022-05-27 NOTE — PHYSICIAN QUERY
PT Name: Millicent Arambula  MR #: [id number]     DOCUMENTATION CLARIFICATION     CDS/: BIANCA Garnett, RN, CCDS               Contact information: [email or web address]  This form is a permanent document in the medical record.     Query Date: May 27, 2022    By submitting this query, we are merely seeking further clarification of documentation to reflect the severity of illness of your patient. Please utilize your independent clinical judgment when addressing the question(s) below.    The medical record reflects the following:     Indicators   Supporting Clinical Findings Location in Medical Record   X Diabetes uncontrolled documented history of diabetes and hypertension   History of diabetes mellitus, not insulin dependent however blood sugars on admission severely uncontrolled and high 500s, started on insulin drip     DM II H & P: Dr. Andrea 5/8           Cards Consult: Dr. Ballard 5/8     X Lab Value(s), POCT glucose value(s) Labs on admission significant for blood sugars in 500s, acidosis with bicarb of 14     Est ave glucose: 544, 780, 800  POCT glucose: 512, 226   H & P: Dr. Andrea 5/8     Lab: 5/6  Lab: 5/8, 5/10    Beta-OHxy Butyric Acid levels      Serum Osmolarity      pH      Anion gap/ Bicarb levels     X Treatment/Medication on insulin gtt     insulin regular in 0.9 % NaCl 100 unit/100 mL (1 unit/mL) infusion  Rate: 2 mL/hr Dose: 2 Units/hr  Freq: Continuous Route: IV  Start: 05/08/22 0530 End: 05/12/22 0540   Cards PN: Dr. Baker 5/9     MAR    Other       Provider, please specify the meaning of the term uncontrolled: Please answer prior to signing    [   ] Diabetes mellitus Type 2 with hyperglycemia   [   ] Other diabetes complication (please specify): ____________   [   ]  Clinically Undetermined       Please document in your progress notes daily for the duration of treatment until resolved, and include in your discharge summary.

## 2022-06-20 NOTE — PHYSICIAN QUERY
PT Name: Millicent Arambula  MR #: [id number]     DOCUMENTATION CLARIFICATION     CDS/: BIANCA Garnett, RN, CCDS               Contact information: [email or web address]--------[email or web address] [email or web address] [employer]ADER[employer] GUIDANCE.   This form is a permanent document in the medical record.     Query Date: June 20, 2022    By submitting this query, we are merely seeking further clarification of documentation to reflect the severity of illness of your patient. Please utilize your independent clinical judgment when addressing the question(s) below.    The medical record reflects the following:     Indicators   Supporting Clinical Findings Location in Medical Record   X Diabetes uncontrolled documented history of diabetes and hypertension   History of diabetes mellitus, not insulin dependent however blood sugars on admission severely uncontrolled and high 500s, started on insulin drip     DM II H & P: Dr. Andrea 5/8           Cards Consult: Dr. Ballard 5/8     X Lab Value(s), POCT glucose value(s) Labs on admission significant for blood sugars in 500s, acidosis with bicarb of 14     Est ave glucose: 544, 780, 800  POCT glucose: 512, 226 H & P: Dr. Andrea 5/8     Lab: 5/6  Lab: 5/8, 5/10    Beta-OHxy Butyric Acid levels      Serum Osmolarity      pH      Anion gap/ Bicarb levels     X Treatment/Medication on insulin gtt     insulin regular in 0.9 % NaCl 100 unit/100 mL (1 unit/mL) infusion  Rate: 2 mL/hr Dose: 2 Units/hr  Freq: Continuous Route: IV  Start: 05/08/22 0530 End: 05/12/22 0540 Cards PN: Dr. Baker 5/9     MAR    Other       Provider, please specify the meaning of the term uncontrolled:    [   ] Diabetes mellitus Type 2 with hyperglycemia   [   ] Other diabetes complication (please specify): ____________   [   ]  Clinically Undetermined       Please document in your progress notes daily for the duration of treatment until resolved, and include in your discharge summary.

## 2023-04-04 ENCOUNTER — TELEPHONE (OUTPATIENT)
Dept: ENDOCRINOLOGY | Facility: CLINIC | Age: 73
End: 2023-04-04
Payer: MEDICARE